# Patient Record
Sex: FEMALE | Race: WHITE | NOT HISPANIC OR LATINO | Employment: FULL TIME | URBAN - METROPOLITAN AREA
[De-identification: names, ages, dates, MRNs, and addresses within clinical notes are randomized per-mention and may not be internally consistent; named-entity substitution may affect disease eponyms.]

---

## 2019-05-29 ENCOUNTER — CONVERSION ENCOUNTER (OUTPATIENT)
Dept: OTHER | Facility: HOSPITAL | Age: 54
End: 2019-05-29

## 2019-05-29 ENCOUNTER — HOSPITAL ENCOUNTER (OUTPATIENT)
Dept: OTHER | Facility: HOSPITAL | Age: 54
Discharge: HOME | End: 2019-05-29
Attending: NURSE PRACTITIONER | Admitting: NURSE PRACTITIONER

## 2019-05-29 LAB
25(OH)D3 SERPL-MCNC: 21 NG/ML (ref 30–100)
ALBUMIN SERPL-MCNC: 4.2 G/DL (ref 3.5–4.8)
ALBUMIN/GLOB SERPL: 1.7 {RATIO} (ref 1–1.7)
ALP SERPL-CCNC: 32 IU/L (ref 32–91)
ALT SERPL-CCNC: 17 IU/L (ref 14–54)
ANION GAP SERPL CALC-SCNC: 16.7 MMOL/L (ref 10–20)
AST SERPL-CCNC: 16 IU/L (ref 15–41)
BASOPHILS # BLD AUTO: 0.1 10*3/UL (ref 0–0.2)
BASOPHILS NFR BLD AUTO: 1 % (ref 0–2)
BILIRUB SERPL-MCNC: 0.9 MG/DL (ref 0.3–1.2)
BUN SERPL-MCNC: 7 MG/DL (ref 8–20)
BUN/CREAT SERPL: 8.8 (ref 5.4–26.2)
CALCIUM SERPL-MCNC: 9 MG/DL (ref 8.9–10.3)
CHLORIDE SERPL-SCNC: 102 MMOL/L (ref 101–111)
CHOLEST SERPL-MCNC: 257 MG/DL
CHOLEST/HDLC SERPL: 4.5 {RATIO}
CONV CO2: 23 MMOL/L (ref 22–32)
CONV LDL CHOLESTEROL DIRECT: 193 MG/DL (ref 0–100)
CONV TOTAL PROTEIN: 6.7 G/DL (ref 6.1–7.9)
CREAT UR-MCNC: 0.8 MG/DL (ref 0.4–1)
DIFFERENTIAL METHOD BLD: (no result)
EOSINOPHIL # BLD AUTO: 0.1 10*3/UL (ref 0–0.3)
EOSINOPHIL # BLD AUTO: 1 % (ref 0–3)
ERYTHROCYTE [DISTWIDTH] IN BLOOD BY AUTOMATED COUNT: 13.4 % (ref 11.5–14.5)
GLOBULIN UR ELPH-MCNC: 2.5 G/DL (ref 2.5–3.8)
GLUCOSE SERPL-MCNC: 106 MG/DL (ref 65–99)
HBA1C MFR BLD: 5.4 % (ref 0–5.6)
HCT VFR BLD AUTO: 43 % (ref 35–49)
HDLC SERPL-MCNC: 58 MG/DL
HGB BLD-MCNC: 14.3 G/DL (ref 12–15)
LDLC/HDLC SERPL: 3.4 {RATIO}
LIPID INTERPRETATION: ABNORMAL
LYMPHOCYTES # BLD AUTO: 3.2 10*3/UL (ref 0.8–4.8)
LYMPHOCYTES NFR BLD AUTO: 27 % (ref 18–42)
MCH RBC QN AUTO: 28.7 PG (ref 26–32)
MCHC RBC AUTO-ENTMCNC: 33.2 G/DL (ref 32–36)
MCV RBC AUTO: 86.5 FL (ref 80–94)
MONOCYTES # BLD AUTO: 0.6 10*3/UL (ref 0.1–1.3)
MONOCYTES NFR BLD AUTO: 5 % (ref 2–11)
NEUTROPHILS # BLD AUTO: 7.7 10*3/UL (ref 2.3–8.6)
NEUTROPHILS NFR BLD AUTO: 66 % (ref 50–75)
NRBC BLD AUTO-RTO: 0 /100{WBCS}
NRBC/RBC NFR BLD MANUAL: 0 10*3/UL
PLATELET # BLD AUTO: 301 10*3/UL (ref 150–450)
PMV BLD AUTO: 8.7 FL (ref 7.4–10.4)
POTASSIUM SERPL-SCNC: 3.7 MMOL/L (ref 3.6–5.1)
RBC # BLD AUTO: 4.97 10*6/UL (ref 4–5.4)
SODIUM SERPL-SCNC: 138 MMOL/L (ref 136–144)
TRIGL SERPL-MCNC: 212 MG/DL
VIT B12 SERPL-MCNC: 305 PG/ML (ref 180–914)
VLDLC SERPL CALC-MCNC: 6 MG/DL
WBC # BLD AUTO: 11.8 10*3/UL (ref 4.5–11.5)

## 2019-06-04 VITALS
SYSTOLIC BLOOD PRESSURE: 107 MMHG | HEART RATE: 82 BPM | DIASTOLIC BLOOD PRESSURE: 73 MMHG | WEIGHT: 223 LBS | OXYGEN SATURATION: 98 % | HEIGHT: 67 IN | BODY MASS INDEX: 35 KG/M2

## 2019-06-06 NOTE — PROGRESS NOTES
History of Present Illness:  Patient is a 53-year-old female who presents today as a new patient to establish care. She is here for CPE. She does not have signficant medical hx.  She did have heart cath in 2015 and denies further chest pain. Patient was seen in ED on 5/21/19 with new   onset dizziness, syncope, and new onset studdering.  She reports having a headache intermittently. She denies confusion.  Speech is not slurred but reports studdering occurs when she becomes anxious. She is tearful in office.  She does report some anxiety   but denies hx of medication use.  She denies weakness.        Past Surgical History:     hysterectomy 1988     heart cath 2017    Family History Summary:   Mother - Has Family History of Other Cancer - Entered On: 5/29/2019  Father - Has Family History of Other Cancer - Entered On: 5/29/2019  Mother - Has Family History of Heart Disease - Entered On: 5/29/2019  Father - Has Family History of Heart Disease - Entered On: 5/29/2019        Risk Factors:     Smoked Tobacco Use:  Current every day smoker     Cigarettes:  Yes -- 1 pack(s) per day,Smokeless Tobacco Use:  Never  Drug use:  no  Alcohol use:  yes     Type:  occasional     Previous Tobacco Use:   Previous Alcohol Use:     Review of Systems     General       Denies fever, chills, sweats, anorexia, fatigue, weakness, malaise, weight loss, weight gain and sleep disorder.    Eyes       Denies vision loss - 1 eye, double vision, eye irritation, vision loss - both eyes, blurring, eye pain, halos, discharge, light sensitivity, Color Blindness, Decreased night vision, excessive tearing, eye redness and periorbital puffiness.    ENT       Denies ringing in the ears, ear discharge, earache, decreased hearing, nasal congestion, nosebleeds, difficulty swallowing, hoarseness, sore throat, Post-nasal drainage, sneezing, bleeding gums, oral ulcers, decreased sense of smell, dental issues,   runny nose, sinus pain and decreased sense of  taste.    CV       Denies difficulty breathing at night, near fainting, chest pain or discomfort, racing/skipping heart beats, fatigue, lightheadedness, shortness of breath with exertion, palpitations, swelling of hands or feet, difficulty breathing while lying down,   fainting, leg cramps with exertion, bluish discoloration of lips or nails, weight gain, leg cramps, leg pain, varicose veins, paroxysmal nocturnal dyspnea and bluish or purplish discoloration of hands/feet.    Resp       Denies sleep disturbances due to breathing, cough, shortness of breath, coughing up blood, chest discomfort, wheezing, excessive sputum, excessive snoring and TB exposure risk.    GI       Denies excessive appetite, loss of appetite, indigestion, vomiting blood, nausea, vomiting, yellowish skin color, gas, abdominal pain, abdominal bloating, hemorrhoids, diarrhea, change in bowel habits, constipation, dark tarry stools, bloody stools,   abdominal mass, abdominal swelling, food intolerance, early satiety, stool incontinence, laxative use, odynophagia, painful defecation, need for antacids and blood after wiping.           Denies foul urinary discharge, blood in urine, urinary frequency, inability to empty bladder, urinary urgency, kidney pain, trouble starting urinary stream, painful urination, night time urination, inability to control bladder, genital sores, lack of   sexual drive, excessive heavy periods, missed periods, unusual urinary color, other abnormal vaginal bleeding, pelvic pain, amenorrhea, change in bladder habits, irregular menses, dysmenorrhea, vaginal itching, vaginal burning, dyspareunia, excessive   menstrual bleeding, vaginal bleeding, vaginal discharge, vaginal dryness, Hx of STD, bleeding after menopause, usage of HRT, flank pain, pelvic pain, polyuria, urethral discharge, urgency, retention and kidney stones.    MS       Denies muscle cramps, joint pain, joint swelling, presence of joint fluid, back pain,  stiffness, muscle weakness, arthritis, gout, loss of strength, muscle aches, neck pain, decreased ROM and joint redness.    Derm       Denies suspicious lesions.    Neuro       Complains of headaches and dizziness.       Denies difficulty with concentration, poor balance, disturbances in coordination, numbness, inability to speak, falling down, tingling, brief paralysis, visual disturbances, seizures, weakness, sensation of room spinning, tremors, fainting, excessive   daytime sleeping, memory loss, attention deficit, hyperactivity, unusual sensation and restless legs.    Psych       Complains of anxiety.       Denies sense of great danger, thoughts of suicide, mental problems, depression, thoughts of violence, frightening visions or sounds, change in sleep pattern, delusions, disorientation, easily irritated, feels safe at home, frequent crying,   hallucinations, hypersomnia, impaired cognitive function, inability to concentrate, mood changes, nervousness and personality changes.      Vital Signs:    Patient Profile:    53 Years Old Female  Height:     67 inches  Weight:     223 pounds  BMI:        34.92     O2 Sat:     98 %  Pulse rate: 82 / minute  BP Sittin / 73  (left arm)    Cuff size:  large    Medications: Medications were reviewed with the patient during this visit.  Allergies: Allergies were reviewed with the patient during this visit.  No Known Allergy.        Vitals Entered By: Aspen Alfredo [employer] (May 29, 2019 8:27 AM)      Vital Signs:    Patient Profile:    53 Years Old Female  Height:     67 inches  Weight:     223 pounds  (Measured Weight:   lbs.  oz.)  BMI:        34.92  Pulse rate: 82 / minute  O2 Sat:     98 %    BP sittin / 73  (left arm)  Cuff size:  large   Vitals Entered By: Aspen [employer] (May 29, 2019 8:27 AM)    Medications:  Medications were reviewed with the patient during this visit.    Allergies:   No Known Allergies  Allergies were reviewed with the patient during  this visit.      Physical Exam    General:      well developed, well nourished, in no acute distress.    Head:      normocephalic and atraumatic.    Eyes:      PERRL/EOM intact, conjunctiva and sclera clear with out nystagmus.    Ears:      TM's intact and clear with normal canals with grossly normal hearing.    Nose:      no deformity, discharge, inflammation, or lesions.    Mouth:      no deformity or lesions with good dentition.    Neck:      no masses, thyromegaly, or abnormal cervical nodes.    Lungs:      clear bilaterally to auscultation.    Heart:      non-displaced PMI, chest non-tender; regular rate and rhythm, S1, S2 without murmurs, rubs, or gallops  Abdomen:       normal bowel sounds; no hepatosplenomegaly no ventral,umbilical hernias or masses noted.    Msk:      no deformity or scoliosis noted of thoracic or lumbar spine.    Pulses:      pulses normal in all 4 extremities.    Extremities:      no clubbing, cyanosis, edema, or deformity noted with normal full range of motion of joints of all four extremities   Neurologic:      no focal deficits, normal sensation, reflexes, coordination, muscle strength and tone.  Patient does have studdering in office.  Psych:      anxious.  tearful.    Diabetes Management Exam:      Foot Exam (with socks and/or shoes not present):        Pulses:           pulses normal in all 4 extremities.        Blood Pressure:  Today's BP: 107/73 mm Hg              Impression & Recommendations:    Problem # 1:  Preventive Health Care (XWM13-J02.8)  Hysterectomy  Mammogram- order today  DEXA- order today  Colonoscopy-last year 2017  Shingrix discussed      Problem # 2:  Slurred speech (ICD-784.59) (HQP88-C20.81)  Send for Stat MRI.  Refer to neurology. At this point, patient's only symptom appears to be studdering which she reports happens when she becomes anxious so I suspect psychogenic.  I will do MRI and refer to neurology and psych. I discussed in detail that   patient should  go back to ED if symptoms seem to worsen.   Orders:  MRI BRAIN W/WO (STANDARD) (CPT-19093)      Problem # 3:  Syncope and collapse (ICD-780.2) (MMI95-V71)  Send for STAT MRI and labs.  Orders:  MRI BRAIN W/WO (STANDARD) (CPT-12854)      Problem # 4:  Anxiety (ICD-300.00) (MGK72-O94.9)  Refer to psych.    Medications Added to Medication List This Visit:  1)  Vitamin D (ergocalciferol) 28747 Unit Oral Capsule (Ergocalciferol) .... 1 by mouth every week  2)  B-12 1000 Mcg Oral Capsule (Cyanocobalamin) .... Take one tablet by mouth once daily  3)  Medrol 4 Mg Oral Tablet Therapy Pack (Methylprednisolone) .... By mouth as directed on package  4)  Augmentin 875-125 Mg Oral Tablet (Amoxicillin-pot clavulanate) .... Take 1 by mouth twice daily with meals for 10 days  5)  Atorvastatin Calcium 10 Mg Oral Tablet (Atorvastatin calcium) .... Take one by mouth daily  6)  Albuterol Sulfate Hfa 108 (90 Base) Mcg/act Inhalation Aerosol Solution (Albuterol sulfate) .... One puff every 4 hours    Other Orders:  [facility] COMPREHENSIVE METABOLIC PANEL (CMP) (MPC)  [facility] LIPID PANEL (LIPID)  [facility] CBC W/DIFF; PATH REVIEW IF INDICATED (CBC)  [facility] HEMOGLOBIN A1c (A1DCA)  [facility] VITAMIN D TOTAL (VITD)  [facility] VITAMIN B12 (B12)  BILATERAL SCREENING MAMMOGRAM (CPT-19128)  DEXA SCAN (CPT-44393)  New Well Exam 40-64 yrs. (85081)      Patient Instructions:  1)  Follow up in 1 week.          Prescriptions:  VITAMIN D (ERGOCALCIFEROL) 79699 UNIT ORAL CAPSULE (ERGOCALCIFEROL) 1 by mouth every week  #4[Capsule] x 2      Entered and Authorized by:  Elaine Solano      Electronically signed by:   Elaine Solano on 05/29/2019      Method used:    Electronically to               [facility] (retail)              [address] [place] [address]  [id number]              Ph: [phone]              Fax: [phone]      Note to Pharmacy: Route: ORAL;       RxID:   [id number]  B-12 1000 MCG ORAL CAPSULE (CYANOCOBALAMIN) Take one  tablet by mouth once daily  #30[Capsule] x 0      Entered and Authorized by:  Elaine Solano      Electronically signed by:   Elaine Solano on 05/29/2019      Method used:    Electronically to               [facility] (retail)              [facility] [address] [place]  [id number]              Ph: [phone]              Fax: [phone]      Note to Pharmacy: Route: ORAL;       RxID:   [id number]  MEDROL 4 MG ORAL TABLET THERAPY PACK (METHYLPREDNISOLONE) by mouth as directed on package  #1[Tablet] x 0      Entered and Authorized by:  Elaine Solano      Electronically signed by:   Elaine Solano on 05/29/2019      Method used:    Electronically to               [facility] [address] (retail)              [address] [place] [address], [place]  [id number]              Ph: [phone]              Fax: [phone]      Note to Pharmacy: Route: ORAL;       RxID:   [id number]  AUGMENTIN 875-125 MG ORAL TABLET (AMOXICILLIN-POT CLAVULANATE) take 1 by mouth twice daily with meals for 10 days  #20[Tablet] x 0      Entered and Authorized by:  Elaine Solano      Electronically signed by:   Elaine Solano on 05/29/2019      Method used:    Electronically to               [facility] [address] (retail)              [facility] [address], [place]  [id number]              Ph: [phone]              Fax: [phone]      Note to Pharmacy: Route: ORAL;       RxID:   [id number]  ATORVASTATIN CALCIUM 10 MG ORAL TABLET (ATORVASTATIN CALCIUM) Take one by mouth daily  #30[Tablet] x 3      Entered and Authorized by:  Elaine Solano      Electronically signed by:   Elaine Solano on 05/29/2019      Method used:    Electronically to               [facility] [address] (retail)              [facility] [address], [place]  [id number]              Ph: [phone]              Fax: [phone]      Note to Pharmacy:  Route: ORAL;       RxID:   [id number]                Medication Administration    Orders Added:  1)  MRI BRAIN W/WO (STANDARD) [CPT-85537]  2)  [facility] COMPREHENSIVE METABOLIC PANEL (CMP) [MPC]  3)  [facility] LIPID PANEL [LIPID]  4)  [facility] CBC W/DIFF; PATH REVIEW IF INDICATED [CBC]  5)  [facility] HEMOGLOBIN A1c [A1DCA]  6)  H VITAMIN D TOTAL [VITD]  7)  H VITAMIN B12 [B12]  8)  BILATERAL SCREENING MAMMOGRAM [CPT-32340]  9)  DEXA SCAN [CPT-86073]  10)  New Well Exam 40-64 yrs. [15637]        Electronically signed by Elaine Solano on 05/29/2019 at 5:04 PM  ________________________________________________________________________       Disclaimer: Converted Note message may not contain all data elements that existed in the legacy source system. Please see [employer] [facility] for the original note details.

## 2021-02-02 ENCOUNTER — OFFICE VISIT (OUTPATIENT)
Dept: FAMILY MEDICINE CLINIC | Facility: CLINIC | Age: 56
End: 2021-02-02

## 2021-02-02 VITALS
OXYGEN SATURATION: 98 % | BODY MASS INDEX: 30.76 KG/M2 | WEIGHT: 196 LBS | HEIGHT: 67 IN | SYSTOLIC BLOOD PRESSURE: 136 MMHG | TEMPERATURE: 98 F | HEART RATE: 66 BPM | DIASTOLIC BLOOD PRESSURE: 85 MMHG

## 2021-02-02 DIAGNOSIS — Z12.31 ENCOUNTER FOR SCREENING MAMMOGRAM FOR BREAST CANCER: ICD-10-CM

## 2021-02-02 DIAGNOSIS — Z72.0 TOBACCO USE: Primary | ICD-10-CM

## 2021-02-02 DIAGNOSIS — M62.838 MUSCLE SPASM: ICD-10-CM

## 2021-02-02 DIAGNOSIS — J44.9 CHRONIC OBSTRUCTIVE PULMONARY DISEASE, UNSPECIFIED COPD TYPE (HCC): ICD-10-CM

## 2021-02-02 DIAGNOSIS — F34.1 DYSTHYMIA: ICD-10-CM

## 2021-02-02 DIAGNOSIS — Z00.00 PREVENTATIVE HEALTH CARE: ICD-10-CM

## 2021-02-02 DIAGNOSIS — K59.04 CHRONIC IDIOPATHIC CONSTIPATION: ICD-10-CM

## 2021-02-02 PROCEDURE — 36415 COLL VENOUS BLD VENIPUNCTURE: CPT | Performed by: NURSE PRACTITIONER

## 2021-02-02 PROCEDURE — 84443 ASSAY THYROID STIM HORMONE: CPT | Performed by: NURSE PRACTITIONER

## 2021-02-02 PROCEDURE — 80061 LIPID PANEL: CPT | Performed by: NURSE PRACTITIONER

## 2021-02-02 PROCEDURE — 80053 COMPREHEN METABOLIC PANEL: CPT | Performed by: NURSE PRACTITIONER

## 2021-02-02 PROCEDURE — 85025 COMPLETE CBC W/AUTO DIFF WBC: CPT | Performed by: NURSE PRACTITIONER

## 2021-02-02 PROCEDURE — 99204 OFFICE O/P NEW MOD 45 MIN: CPT | Performed by: NURSE PRACTITIONER

## 2021-02-02 PROCEDURE — 86803 HEPATITIS C AB TEST: CPT | Performed by: NURSE PRACTITIONER

## 2021-02-02 RX ORDER — ESCITALOPRAM OXALATE 10 MG/1
10 TABLET ORAL DAILY
Qty: 30 TABLET | Refills: 1 | Status: SHIPPED | OUTPATIENT
Start: 2021-02-02 | End: 2021-03-02

## 2021-02-02 RX ORDER — ALBUTEROL SULFATE 90 UG/1
2 AEROSOL, METERED RESPIRATORY (INHALATION) EVERY 4 HOURS PRN
Qty: 1 G | Refills: 2 | Status: SHIPPED | OUTPATIENT
Start: 2021-02-02 | End: 2022-01-20 | Stop reason: SDUPTHER

## 2021-02-02 RX ORDER — MAGNESIUM CARB/ALUMINUM HYDROX 105-160MG
TABLET,CHEWABLE ORAL ONCE
COMMUNITY
End: 2021-03-02

## 2021-02-02 RX ORDER — ALBUTEROL SULFATE 90 UG/1
AEROSOL, METERED RESPIRATORY (INHALATION)
COMMUNITY
Start: 2019-05-29 | End: 2021-02-02 | Stop reason: SDUPTHER

## 2021-02-02 NOTE — PROGRESS NOTES
Chief Complaint  Establish Care (bowel issues and dehydration. Issues with constipation. She stated she goes several weeks at times without going. Noticed blood in stool with last bowel movement and had some vaginal bleeding on Thursday, Friday and Saturday. )    Subjective          Amy Pyle presents to [facility] for   Constipation  This is a chronic problem. The problem has been gradually worsening since onset. Her stool frequency is 1 time per week or less. Associated symptoms include abdominal pain, flatus, melena and nausea. Pertinent negatives include no diarrhea, difficulty urinating, rectal pain or vomiting. She has tried enemas and stool softeners (Magnesium Citrate) for the symptoms. There is no history of irritable bowel syndrome.   COPD  She complains of wheezing. There is no cough. This is a chronic problem. Associated symptoms include dyspnea on exertion. Her symptoms are alleviated by beta-agonist (out of medication). She reports moderate improvement on treatment.   Depression  Visit Type: initial  Onset of symptoms: 1 to 6 months ago  Progression since onset: gradually worsening  Patient presents with the following symptoms: decreased concentration, depressed mood and feelings of worthlessness.  Patient is not experiencing: suicidal ideas and thoughts of death.  Frequency of symptoms: most days   Severity: moderate   Aggravated by: family issues  Sleep quality: fair  Treatment tried: nothing      Muscle Spasms: Patient c/o muscle cramps/spasms in bilateral lower extremities, worse at night. No known aggravating factors. Patient reports that she does not drink water because she does not like the taste and works 8-12 hour shifts, standing the entire time.   PHQ-9 Depression Screening  Little interest or pleasure in doing things? 2   Feeling down, depressed, or hopeless? 2   Trouble falling or staying asleep, or sleeping too much? 3   Feeling tired or having little  "energy? 3   Poor appetite or overeating? 3   Feeling bad about yourself - or that you are a failure or have let yourself or your family down? 2   Trouble concentrating on things, such as reading the newspaper or watching television? 2   Moving or speaking so slowly that other people could have noticed? Or the opposite - being so fidgety or restless that you have been moving around a lot more than usual? 0   Thoughts that you would be better off dead, or of hurting yourself in some way? 0   PHQ-9 Total Score 17   If you checked off any problems, how difficult have these problems made it for you to do your work, take care of things at home, or get along with other people? Somewhat difficult     Objective   Vital Signs:   /85 (BP Location: Left arm, Patient Position: Sitting, Cuff Size: Adult)   Pulse 66   Temp 98 °F (36.7 °C) (Skin)   Ht 170.2 cm (67\")   Wt 88.9 kg (196 lb)   SpO2 98%   BMI 30.70 kg/m²     Physical Exam   Result Review :                 Assessment and Plan    Problem List Items Addressed This Visit        Gastrointestinal Abdominal     Chronic idiopathic constipation    Current Assessment & Plan     1. KUB, complete today  2. Refer to GI         Relevant Orders    XR Abdomen KUB    Ambulatory Referral to Gastroenterology (Completed)    CBC & Differential    Comprehensive Metabolic Panel    TSH    CBC Auto Differential       Genitourinary and Reproductive     Encounter for screening mammogram for breast cancer    Current Assessment & Plan     1. Mammogram ordered         Relevant Orders    Mammo Screening Bilateral With CAD       Health Encounters    Preventative health care    Current Assessment & Plan     1. CBC CMP TSH Lipid panel Hep C screening         Relevant Orders    Hepatitis C Antibody    Lipid Panel       Mental Health    Dysthymia    Current Assessment & Plan     1. Start Lexapro 10mg daily         Relevant Medications    escitalopram (Lexapro) 10 MG tablet       " Musculoskeletal and Injuries    Muscle spasm    Current Assessment & Plan     1. Check Magnesium level (CBC CMP ordered as well)  2. Push water  3. Stretch before and after work  4. Massage and heat therapy         Relevant Orders    Magnesium       Pulmonary and Pneumonias    Chronic obstructive pulmonary disease (CMS/HCC)    Current Assessment & Plan     1. ProAir PRN dyspnea/wheezing  2. Encouraged smoking cessation             Relevant Medications    albuterol sulfate  (90 Base) MCG/ACT inhaler       Tobacco    Tobacco use - Primary        I spent 45 minutes caring for Amy on this date of service. This time includes time spent by me in the following activities:preparing for the visit, obtaining and/or reviewing a separately obtained history, performing a medically appropriate examination and/or evaluation , counseling and educating the patient/family/caregiver, ordering medications, tests, or procedures, referring and communicating with other health care professionals , documenting information in the medical record and care coordination  Follow Up   Return in about 4 weeks (around 3/2/2021).  Patient was given instructions and counseling regarding her condition or for health maintenance advice. Please see specific information pulled into the AVS if appropriate.

## 2021-02-02 NOTE — ASSESSMENT & PLAN NOTE
1. Check Magnesium level (CBC CMP ordered as well)  2. Push water  3. Stretch before and after work  4. Massage and heat therapy

## 2021-02-02 NOTE — PATIENT INSTRUCTIONS
Steps to Quit Smoking  Smoking tobacco is the leading cause of preventable death. It can affect almost every organ in the body. Smoking puts you and those around you at risk for developing many serious chronic diseases. Quitting smoking can be difficult, but it is one of the best things that you can do for your health. It is never too late to quit.  How do I get ready to quit?  When you decide to quit smoking, create a plan to help you succeed. Before you quit:  · Pick a date to quit. Set a date within the next 2 weeks to give you time to prepare.  · Write down the reasons why you are quitting. Keep this list in places where you will see it often.  · Tell your family, friends, and co-workers that you are quitting. Support from your loved ones can make quitting easier.  · Talk with your health care provider about your options for quitting smoking.  · Find out what treatment options are covered by your health insurance.  · Identify people, places, things, and activities that make you want to smoke (triggers). Avoid them.  What first steps can I take to quit smoking?  · Throw away all cigarettes at home, at work, and in your car.  · Throw away smoking accessories, such as ashtrays and lighters.  · Clean your car. Make sure to empty the ashtray.  · Clean your home, including curtains and carpets.  What strategies can I use to quit smoking?  Talk with your health care provider about combining strategies, such as taking medicines while you are also receiving in-person counseling. Using these two strategies together makes you more likely to succeed in quitting than if you used either strategy on its own.  · If you are pregnant or breastfeeding, talk with your health care provider about finding counseling or other support strategies to quit smoking. Do not take medicine to help you quit smoking unless your health care provider tells you to do so.  To quit smoking:  Quit right away  · Quit smoking completely, instead of  gradually reducing how much you smoke over a period of time. Research shows that stopping smoking right away is more successful than gradually quitting.  · Attend in-person counseling to help you build problem-solving skills. You are more likely to succeed in quitting if you attend counseling sessions regularly. Even short sessions of 10 minutes can be effective.  Take medicine  You may take medicines to help you quit smoking. Some medicines require a prescription and some you can purchase over-the-counter. Medicines may have nicotine in them to replace the nicotine in cigarettes. Medicines may:  · Help to stop cravings.  · Help to relieve withdrawal symptoms.  Your health care provider may recommend:  · Nicotine patches, gum, or lozenges.  · Nicotine inhalers or sprays.  · Non-nicotine medicine that is taken by mouth.  Find resources  Find resources and support systems that can help you to quit smoking and remain smoke-free after you quit. These resources are most helpful when you use them often. They include:  · Online chats with a counselor.  · Telephone quitlines.  · Printed self-help materials.  · Support groups or group counseling.  · Text messaging programs.  · Mobile phone apps or applications. Use apps that can help you stick to your quit plan by providing reminders, tips, and encouragement. There are many free apps for mobile devices as well as websites. Examples include Quit Guide from the CDC and smokefree.gov  What things can I do to make it easier to quit?    · Reach out to your family and friends for support and encouragement. Call telephone quitlines (5-976-QUIT-NOW), reach out to support groups, or work with a counselor for support.  · Ask people who smoke to avoid smoking around you.  · Avoid places that trigger you to smoke, such as bars, parties, or smoke-break areas at work.  · Spend time with people who do not smoke.  · Lessen the stress in your life. Stress can be a smoking trigger for some  people. To lessen stress, try:  ? Exercising regularly.  ? Doing deep-breathing exercises.  ? Doing yoga.  ? Meditating.  ? Performing a body scan. This involves closing your eyes, scanning your body from head to toe, and noticing which parts of your body are particularly tense. Try to relax the muscles in those areas.  How will I feel when I quit smoking?  Day 1 to 3 weeks  Within the first 24 hours of quitting smoking, you may start to feel withdrawal symptoms. These symptoms are usually most noticeable 2-3 days after quitting, but they usually do not last for more than 2-3 weeks. You may experience these symptoms:  · Mood swings.  · Restlessness, anxiety, or irritability.  · Trouble concentrating.  · Dizziness.  · Strong cravings for sugary foods and nicotine.  · Mild weight gain.  · Constipation.  · Nausea.  · Coughing or a sore throat.  · Changes in how the medicines that you take for unrelated issues work in your body.  · Depression.  · Trouble sleeping (insomnia).  Week 3 and afterward  After the first 2-3 weeks of quitting, you may start to notice more positive results, such as:  · Improved sense of smell and taste.  · Decreased coughing and sore throat.  · Slower heart rate.  · Lower blood pressure.  · Clearer skin.  · The ability to breathe more easily.  · Fewer sick days.  Quitting smoking can be very challenging. Do not get discouraged if you are not successful the first time. Some people need to make many attempts to quit before they achieve long-term success. Do your best to stick to your quit plan, and talk with your health care provider if you have any questions or concerns.  Summary  · Smoking tobacco is the leading cause of preventable death. Quitting smoking is one of the best things that you can do for your health.  · When you decide to quit smoking, create a plan to help you succeed.  · Quit smoking right away, not slowly over a period of time.  · When you start quitting, seek help from your  health care provider, family, or friends.  This information is not intended to replace advice given to you by your health care provider. Make sure you discuss any questions you have with your health care provider.  Document Revised: 09/11/2020 Document Reviewed: 03/07/2020  Elsevier Patient Education © 2020 [employer]

## 2021-02-03 ENCOUNTER — TELEPHONE (OUTPATIENT)
Dept: FAMILY MEDICINE CLINIC | Facility: CLINIC | Age: 56
End: 2021-02-03

## 2021-02-03 LAB
ALBUMIN SERPL-MCNC: 4.4 G/DL (ref 3.5–5.2)
ALBUMIN/GLOB SERPL: 1.8 G/DL
ALP SERPL-CCNC: 46 U/L (ref 39–117)
ALT SERPL W P-5'-P-CCNC: 12 U/L (ref 1–33)
ANION GAP SERPL CALCULATED.3IONS-SCNC: 11.3 MMOL/L (ref 5–15)
AST SERPL-CCNC: 12 U/L (ref 1–32)
BASOPHILS # BLD AUTO: 0.09 10*3/MM3 (ref 0–0.2)
BASOPHILS NFR BLD AUTO: 0.7 % (ref 0–1.5)
BILIRUB SERPL-MCNC: 0.3 MG/DL (ref 0–1.2)
BUN SERPL-MCNC: 11 MG/DL (ref 6–20)
BUN/CREAT SERPL: 22 (ref 7–25)
CALCIUM SPEC-SCNC: 9.9 MG/DL (ref 8.6–10.5)
CHLORIDE SERPL-SCNC: 102 MMOL/L (ref 98–107)
CHOLEST SERPL-MCNC: 199 MG/DL (ref 0–200)
CO2 SERPL-SCNC: 26.7 MMOL/L (ref 22–29)
CREAT SERPL-MCNC: 0.5 MG/DL (ref 0.57–1)
DEPRECATED RDW RBC AUTO: 37.2 FL (ref 37–54)
EOSINOPHIL # BLD AUTO: 0.06 10*3/MM3 (ref 0–0.4)
EOSINOPHIL NFR BLD AUTO: 0.5 % (ref 0.3–6.2)
ERYTHROCYTE [DISTWIDTH] IN BLOOD BY AUTOMATED COUNT: 12.5 % (ref 12.3–15.4)
GFR SERPL CREATININE-BSD FRML MDRD: 127 ML/MIN/1.73
GLOBULIN UR ELPH-MCNC: 2.5 GM/DL
GLUCOSE SERPL-MCNC: 97 MG/DL (ref 65–99)
HCT VFR BLD AUTO: 40 % (ref 34–46.6)
HCV AB SER DONR QL: NORMAL
HDLC SERPL-MCNC: 50 MG/DL (ref 40–60)
HGB BLD-MCNC: 12.9 G/DL (ref 12–15.9)
IMM GRANULOCYTES # BLD AUTO: 0.04 10*3/MM3 (ref 0–0.05)
IMM GRANULOCYTES NFR BLD AUTO: 0.3 % (ref 0–0.5)
LDLC SERPL CALC-MCNC: 133 MG/DL (ref 0–100)
LDLC/HDLC SERPL: 2.62 {RATIO}
LYMPHOCYTES # BLD AUTO: 3.59 10*3/MM3 (ref 0.7–3.1)
LYMPHOCYTES NFR BLD AUTO: 29.5 % (ref 19.6–45.3)
MCH RBC QN AUTO: 27.2 PG (ref 26.6–33)
MCHC RBC AUTO-ENTMCNC: 32.3 G/DL (ref 31.5–35.7)
MCV RBC AUTO: 84.2 FL (ref 79–97)
MONOCYTES # BLD AUTO: 0.76 10*3/MM3 (ref 0.1–0.9)
MONOCYTES NFR BLD AUTO: 6.3 % (ref 5–12)
NEUTROPHILS NFR BLD AUTO: 62.7 % (ref 42.7–76)
NEUTROPHILS NFR BLD AUTO: 7.61 10*3/MM3 (ref 1.7–7)
NRBC BLD AUTO-RTO: 0 /100 WBC (ref 0–0.2)
PLATELET # BLD AUTO: 485 10*3/MM3 (ref 140–450)
PMV BLD AUTO: 10.3 FL (ref 6–12)
POTASSIUM SERPL-SCNC: 4.8 MMOL/L (ref 3.5–5.2)
PROT SERPL-MCNC: 6.9 G/DL (ref 6–8.5)
RBC # BLD AUTO: 4.75 10*6/MM3 (ref 3.77–5.28)
SODIUM SERPL-SCNC: 140 MMOL/L (ref 136–145)
TRIGL SERPL-MCNC: 90 MG/DL (ref 0–150)
TSH SERPL DL<=0.05 MIU/L-ACNC: 0.61 UIU/ML (ref 0.27–4.2)
VLDLC SERPL-MCNC: 16 MG/DL (ref 5–40)
WBC # BLD AUTO: 12.15 10*3/MM3 (ref 3.4–10.8)

## 2021-02-03 NOTE — PROGRESS NOTES
Please let patient know that her LDL is slightly elevated. I want her to decrease intake of fried/fatty foods. The rest of her lipid panel is WNL so we can just repeat in 6 months. I am waiting on KUB results. Her WBC is slightly elevated, likely r/t current GI condition. Once I have KUB results, we can plan accordingly.

## 2021-02-03 NOTE — TELEPHONE ENCOUNTER
----- Message from TESSA Renteria sent at 2/3/2021  9:02 AM EST -----  Please let patient know that her LDL is slightly elevated. I want her to decrease intake of fried/fatty foods. The rest of her lipid panel is WNL so we can just repeat in 6 months. I am waiting on KUB results. Her WBC is slightly elevated, likely r/t current GI condition. Once I have KUB results, we can plan accordingly.

## 2021-02-05 NOTE — PROGRESS NOTES
KUB shows normal gas pattern. I want patient to proceed with appointment with GI and keep f/u appointment with me next month.

## 2021-03-02 ENCOUNTER — OFFICE VISIT (OUTPATIENT)
Dept: FAMILY MEDICINE CLINIC | Facility: CLINIC | Age: 56
End: 2021-03-02

## 2021-03-02 VITALS
TEMPERATURE: 97.8 F | OXYGEN SATURATION: 99 % | HEART RATE: 69 BPM | SYSTOLIC BLOOD PRESSURE: 124 MMHG | HEIGHT: 67 IN | DIASTOLIC BLOOD PRESSURE: 76 MMHG | BODY MASS INDEX: 30.13 KG/M2 | WEIGHT: 192 LBS

## 2021-03-02 DIAGNOSIS — Z72.0 TOBACCO USE: Primary | ICD-10-CM

## 2021-03-02 DIAGNOSIS — F34.1 DYSTHYMIA: ICD-10-CM

## 2021-03-02 DIAGNOSIS — K59.04 CHRONIC IDIOPATHIC CONSTIPATION: ICD-10-CM

## 2021-03-02 DIAGNOSIS — E78.2 MIXED HYPERLIPIDEMIA: ICD-10-CM

## 2021-03-02 DIAGNOSIS — D72.829 LEUKOCYTOSIS, UNSPECIFIED TYPE: ICD-10-CM

## 2021-03-02 DIAGNOSIS — M54.50 ACUTE RIGHT-SIDED LOW BACK PAIN WITHOUT SCIATICA: ICD-10-CM

## 2021-03-02 PROCEDURE — 99214 OFFICE O/P EST MOD 30 MIN: CPT | Performed by: NURSE PRACTITIONER

## 2021-03-02 PROCEDURE — 36415 COLL VENOUS BLD VENIPUNCTURE: CPT | Performed by: NURSE PRACTITIONER

## 2021-03-02 PROCEDURE — 85025 COMPLETE CBC W/AUTO DIFF WBC: CPT | Performed by: NURSE PRACTITIONER

## 2021-03-02 RX ORDER — BACLOFEN 10 MG/1
10 TABLET ORAL 3 TIMES DAILY
Qty: 30 TABLET | Refills: 0 | Status: SHIPPED | OUTPATIENT
Start: 2021-03-02 | End: 2021-04-20

## 2021-03-02 RX ORDER — FLUOXETINE 10 MG/1
10 CAPSULE ORAL DAILY
Qty: 30 CAPSULE | Refills: 1 | Status: SHIPPED | OUTPATIENT
Start: 2021-03-02 | End: 2021-04-20

## 2021-03-02 NOTE — PATIENT INSTRUCTIONS
1. Start Melatonin 3mg QHS for sleep  2. Start taking Colace daily, may use twice daily  3. Add 1 scoop Miralax daily PRN  4. The goal is to have bowel movement every 2 days, no more than 3 days  5. Start Baclofen 10mg, use PRN back pain (break in half first time)  6. You may also use Voltaren gel, 3-4 times daily as needed  7. Stop Lexapro  8. Start Prozac 10mg daily  9. Call GI and schedule appointment  10. Have mammogram completed    Steps to Quit Smoking  Smoking tobacco is the leading cause of preventable death. It can affect almost every organ in the body. Smoking puts you and those around you at risk for developing many serious chronic diseases. Quitting smoking can be difficult, but it is one of the best things that you can do for your health. It is never too late to quit.  How do I get ready to quit?  When you decide to quit smoking, create a plan to help you succeed. Before you quit:  · Pick a date to quit. Set a date within the next 2 weeks to give you time to prepare.  · Write down the reasons why you are quitting. Keep this list in places where you will see it often.  · Tell your family, friends, and co-workers that you are quitting. Support from your loved ones can make quitting easier.  · Talk with your health care provider about your options for quitting smoking.  · Find out what treatment options are covered by your health insurance.  · Identify people, places, things, and activities that make you want to smoke (triggers). Avoid them.  What first steps can I take to quit smoking?  · Throw away all cigarettes at home, at work, and in your car.  · Throw away smoking accessories, such as ashtrays and lighters.  · Clean your car. Make sure to empty the ashtray.  · Clean your home, including curtains and carpets.  What strategies can I use to quit smoking?  Talk with your health care provider about combining strategies, such as taking medicines while you are also receiving in-person counseling. Using  these two strategies together makes you more likely to succeed in quitting than if you used either strategy on its own.  · If you are pregnant or breastfeeding, talk with your health care provider about finding counseling or other support strategies to quit smoking. Do not take medicine to help you quit smoking unless your health care provider tells you to do so.  To quit smoking:  Quit right away  · Quit smoking completely, instead of gradually reducing how much you smoke over a period of time. Research shows that stopping smoking right away is more successful than gradually quitting.  · Attend in-person counseling to help you build problem-solving skills. You are more likely to succeed in quitting if you attend counseling sessions regularly. Even short sessions of 10 minutes can be effective.  Take medicine  You may take medicines to help you quit smoking. Some medicines require a prescription and some you can purchase over-the-counter. Medicines may have nicotine in them to replace the nicotine in cigarettes. Medicines may:  · Help to stop cravings.  · Help to relieve withdrawal symptoms.  Your health care provider may recommend:  · Nicotine patches, gum, or lozenges.  · Nicotine inhalers or sprays.  · Non-nicotine medicine that is taken by mouth.  Find resources  Find resources and support systems that can help you to quit smoking and remain smoke-free after you quit. These resources are most helpful when you use them often. They include:  · Online chats with a counselor.  · Telephone quitlines.  · Printed self-help materials.  · Support groups or group counseling.  · Text messaging programs.  · Mobile phone apps or applications. Use apps that can help you stick to your quit plan by providing reminders, tips, and encouragement. There are many free apps for mobile devices as well as websites. Examples include Quit Guide from the CDC and smokefree.gov  What things can I do to make it easier to quit?    · Reach out  to your family and friends for support and encouragement. Call telephone quitlines (8-492-QUITNOW), reach out to support groups, or work with a counselor for support.  · Ask people who smoke to avoid smoking around you.  · Avoid places that trigger you to smoke, such as bars, parties, or smoke-break areas at work.  · Spend time with people who do not smoke.  · Lessen the stress in your life. Stress can be a smoking trigger for some people. To lessen stress, try:  ? Exercising regularly.  ? Doing deep-breathing exercises.  ? Doing yoga.  ? Meditating.  ? Performing a body scan. This involves closing your eyes, scanning your body from head to toe, and noticing which parts of your body are particularly tense. Try to relax the muscles in those areas.  How will I feel when I quit smoking?  Day 1 to 3 weeks  Within the first 24 hours of quitting smoking, you may start to feel withdrawal symptoms. These symptoms are usually most noticeable 2-3 days after quitting, but they usually do not last for more than 2-3 weeks. You may experience these symptoms:  · Mood swings.  · Restlessness, anxiety, or irritability.  · Trouble concentrating.  · Dizziness.  · Strong cravings for sugary foods and nicotine.  · Mild weight gain.  · Constipation.  · Nausea.  · Coughing or a sore throat.  · Changes in how the medicines that you take for unrelated issues work in your body.  · Depression.  · Trouble sleeping (insomnia).  Week 3 and afterward  After the first 2-3 weeks of quitting, you may start to notice more positive results, such as:  · Improved sense of smell and taste.  · Decreased coughing and sore throat.  · Slower heart rate.  · Lower blood pressure.  · Clearer skin.  · The ability to breathe more easily.  · Fewer sick days.  Quitting smoking can be very challenging. Do not get discouraged if you are not successful the first time. Some people need to make many attempts to quit before they achieve long-term success. Do your best  to stick to your quit plan, and talk with your health care provider if you have any questions or concerns.  Summary  · Smoking tobacco is the leading cause of preventable death. Quitting smoking is one of the best things that you can do for your health.  · When you decide to quit smoking, create a plan to help you succeed.  · Quit smoking right away, not slowly over a period of time.  · When you start quitting, seek help from your health care provider, family, or friends.  This information is not intended to replace advice given to you by your health care provider. Make sure you discuss any questions you have with your health care provider.  Document Revised: 09/11/2020 Document Reviewed: 03/07/2020  Elsevier Patient Education © 2020 [employer].

## 2021-03-02 NOTE — PROGRESS NOTES
Chief Complaint  Follow-up (4 week follow up)    Subjective          Amy Pyle presents to [facility]  Depression  Visit Type: follow-up  Patient presents with the following symptoms: depressed mood (increased crying) and feelings of worthlessness.  Patient is not experiencing: thoughts of death and weight loss.  Frequency of symptoms: most days   Severity: moderate   Sleep quality: poor  Nighttime awakenings: several (waking every 2 hours)  Compliance with medications:  %        Constipation  This is a chronic problem. The problem has been gradually worsening since onset. Her stool frequency is 1 time per week or less. The stool is described as firm and pellet like. The patient is not on a high fiber diet. She exercises regularly. There has been adequate water intake. Associated symptoms include abdominal pain (improving) and bloating. Pertinent negatives include no diarrhea, fecal incontinence, fever, melena, nausea or weight loss. She has tried stool softeners (KUB showed normal gas pattern) for the symptoms. The treatment provided mild relief.     Leukocytosis: CBC showed WBC of 12.15 with Neutrophil count 7.61 and lymphocyte count 3.59. Patient denies any s/s infection.     Back Pain: Patient complaining of right-sided back pain for the last week.  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  Patient has been trying Biofreeze, heating pad, Tylenol and Motrin with minimal relief.  Patient denies any radiculopathy, loss of bowel or bladder control.    Review of Systems   Constitutional: Negative for fever and unexpected weight loss.   Gastrointestinal: Positive for abdominal pain (improving), bloating and constipation. Negative for diarrhea, melena and nausea.   Psychiatric/Behavioral: Positive for depressed mood (increased crying).       Objective   Vital Signs:   /76 (BP Location: Left arm, Patient Position: Sitting, Cuff Size: Adult)   Pulse  "69   Temp 97.8 °F (36.6 °C) (Skin)   Ht 170.2 cm (67\")   Wt 87.1 kg (192 lb)   SpO2 99%   BMI 30.07 kg/m²     Physical Exam  Constitutional:       Appearance: Normal appearance.   HENT:      Head: Normocephalic.   Neck:      Musculoskeletal: Neck supple.   Cardiovascular:      Rate and Rhythm: Normal rate and regular rhythm.   Pulmonary:      Effort: Pulmonary effort is normal.      Breath sounds: Normal breath sounds.   Abdominal:      General: Abdomen is flat. Bowel sounds are normal.      Palpations: Abdomen is soft.       Musculoskeletal: Normal range of motion.      Lumbar back: She exhibits tenderness and pain.      Right lower leg: No edema.      Left lower leg: No edema.   Skin:     General: Skin is warm and dry.   Neurological:      Mental Status: She is alert and oriented to person, place, and time.      Gait: Gait is intact.   Psychiatric:         Attention and Perception: Attention normal.         Mood and Affect: Mood normal.         Speech: Speech normal.         PHQ-9 Depression Screening  Little interest or pleasure in doing things? 1   Feeling down, depressed, or hopeless? 2   Trouble falling or staying asleep, or sleeping too much? 2   Feeling tired or having little energy? 1   Poor appetite or overeating? 0   Feeling bad about yourself - or that you are a failure or have let yourself or your family down? 1   Trouble concentrating on things, such as reading the newspaper or watching television? 0   Moving or speaking so slowly that other people could have noticed? Or the opposite - being so fidgety or restless that you have been moving around a lot more than usual? 0   Thoughts that you would be better off dead, or of hurting yourself in some way? 0   PHQ-9 Total Score 7   If you checked off any problems, how difficult have these problems made it for you to do your work, take care of things at home, or get along with other people? Somewhat difficult      Result Review :     CMP    CMP 2/2/21 "   Glucose 97   BUN 11   Creatinine 0.50 (A)   eGFR Non African Am 127   Sodium 140   Potassium 4.8   Chloride 102   Calcium 9.9   Albumin 4.40   Total Bilirubin 0.3   Alkaline Phosphatase 46   AST (SGOT) 12   ALT (SGPT) 12   (A) Abnormal value            CBC    CBC 2/2/21   WBC 12.15 (A)   RBC 4.75   Hemoglobin 12.9   Hematocrit 40.0   MCV 84.2   MCH 27.2   MCHC 32.3   RDW 12.5   Platelets 485 (A)   (A) Abnormal value            Lipid Panel    Lipid Panel 2/2/21   Total Cholesterol 199   Triglycerides 90   HDL Cholesterol 50   VLDL Cholesterol 16   LDL Cholesterol  133 (A)   LDL/HDL Ratio 2.62   (A) Abnormal value            TSH    TSH 2/2/21   TSH 0.614           Data reviewed: Radiologic studies KUB          Assessment and Plan    Diagnoses and all orders for this visit:    1. Tobacco use (Primary)    2. Mixed hyperlipidemia  Assessment & Plan:  Lipid abnormalities are improving.  Nutritional counseling was provided. and Lipid-lowering therapy was not prescribed due to not indicated.  Lipids will be reassessed in 6 months.  The 10-year ASCVD risk score (Aayushjacklyn MEEK Jr., et al., 2013) is: 5.6%    Values used to calculate the score:      Age: 55 years      Sex: Female      Is Non- : No      Diabetic: No      Tobacco smoker: Yes      Systolic Blood Pressure: 124 mmHg      Is BP treated: No      HDL Cholesterol: 50 mg/dL      Total Cholesterol: 199 mg/dL        3. Chronic idiopathic constipation  Assessment & Plan:  1. Patient to start Colace 100mg 1-2 times daily  2. Miralax 17gm daily PRN  3. Reviewed KUB with patient  4. Follow up with GI      4. Dysthymia  Assessment & Plan:  1.  Discontinue Lexapro  2.  Start Prozac 10 mg daily  3.  Call with worsening depressions or adverse effects  4.  May take melatonin 3 mg nightly      5. Leukocytosis, unspecified type  Assessment & Plan:  1. Repeat CBC    Orders:  -     CBC & Differential  -     CBC Auto Differential    6. Acute right-sided low back pain  without sciatica  Assessment & Plan:  1.  Start baclofen 10 mg 3 times daily.  2.  Rest  3.  Apply heat as needed  4.  Discussed proper lifting techniques  5.  Call with new or worsening symptoms      Other orders  -     baclofen (LIORESAL) 10 MG tablet; Take 1 tablet by mouth 3 (Three) Times a Day.  Dispense: 30 tablet; Refill: 0  -     FLUoxetine (PROzac) 10 MG capsule; Take 1 capsule by mouth Daily.  Dispense: 30 capsule; Refill: 1    I spent 30 minutes caring for Amy on this date of service. This time includes time spent by me in the following activities:preparing for the visit, reviewing tests, obtaining and/or reviewing a separately obtained history, performing a medically appropriate examination and/or evaluation , counseling and educating the patient/family/caregiver, ordering medications, tests, or procedures, documenting information in the medical record and care coordination  Follow Up   Return in about 4 weeks (around 3/30/2021).  Patient was given instructions and counseling regarding her condition or for health maintenance advice. Please see specific information pulled into the AVS if appropriate.       Answers for HPI/ROS submitted by the patient on 3/2/2021   What is the primary reason for your visit?: Other  Please describe your symptoms.: Bowel issues  Have you had these symptoms before?: No  How long have you been having these symptoms?: Greater than 2 weeks  Please list any medications you are currently taking for this condition.: Just what Dr prescribed and laxatives

## 2021-03-02 NOTE — ASSESSMENT & PLAN NOTE
1. Patient to start Colace 100mg 1-2 times daily  2. Miralax 17gm daily PRN  3. Reviewed KUB with patient  4. Follow up with GI

## 2021-03-02 NOTE — ASSESSMENT & PLAN NOTE
1.  Discontinue Lexapro  2.  Start Prozac 10 mg daily  3.  Call with worsening depressions or adverse effects  4.  May take melatonin 3 mg nightly

## 2021-03-02 NOTE — ASSESSMENT & PLAN NOTE
Lipid abnormalities are improving.  Nutritional counseling was provided. and Lipid-lowering therapy was not prescribed due to not indicated.  Lipids will be reassessed in 6 months.  The 10-year ASCVD risk score (Aayush MEEK Jr., et al., 2013) is: 5.6%    Values used to calculate the score:      Age: 55 years      Sex: Female      Is Non- : No      Diabetic: No      Tobacco smoker: Yes      Systolic Blood Pressure: 124 mmHg      Is BP treated: No      HDL Cholesterol: 50 mg/dL      Total Cholesterol: 199 mg/dL

## 2021-03-03 ENCOUNTER — TELEPHONE (OUTPATIENT)
Dept: FAMILY MEDICINE CLINIC | Facility: CLINIC | Age: 56
End: 2021-03-03

## 2021-03-03 LAB
BASOPHILS # BLD AUTO: 0.1 10*3/MM3 (ref 0–0.2)
BASOPHILS NFR BLD AUTO: 1.1 % (ref 0–1.5)
DEPRECATED RDW RBC AUTO: 41 FL (ref 37–54)
EOSINOPHIL # BLD AUTO: 0.14 10*3/MM3 (ref 0–0.4)
EOSINOPHIL NFR BLD AUTO: 1.5 % (ref 0.3–6.2)
ERYTHROCYTE [DISTWIDTH] IN BLOOD BY AUTOMATED COUNT: 12.9 % (ref 12.3–15.4)
HCT VFR BLD AUTO: 40.3 % (ref 34–46.6)
HGB BLD-MCNC: 13 G/DL (ref 12–15.9)
IMM GRANULOCYTES # BLD AUTO: 0.02 10*3/MM3 (ref 0–0.05)
IMM GRANULOCYTES NFR BLD AUTO: 0.2 % (ref 0–0.5)
LYMPHOCYTES # BLD AUTO: 3.29 10*3/MM3 (ref 0.7–3.1)
LYMPHOCYTES NFR BLD AUTO: 35.6 % (ref 19.6–45.3)
MCH RBC QN AUTO: 28.1 PG (ref 26.6–33)
MCHC RBC AUTO-ENTMCNC: 32.3 G/DL (ref 31.5–35.7)
MCV RBC AUTO: 87 FL (ref 79–97)
MONOCYTES # BLD AUTO: 0.68 10*3/MM3 (ref 0.1–0.9)
MONOCYTES NFR BLD AUTO: 7.4 % (ref 5–12)
NEUTROPHILS NFR BLD AUTO: 5.02 10*3/MM3 (ref 1.7–7)
NEUTROPHILS NFR BLD AUTO: 54.2 % (ref 42.7–76)
NRBC BLD AUTO-RTO: 0 /100 WBC (ref 0–0.2)
PLATELET # BLD AUTO: 361 10*3/MM3 (ref 140–450)
PMV BLD AUTO: 10.6 FL (ref 6–12)
RBC # BLD AUTO: 4.63 10*6/MM3 (ref 3.77–5.28)
WBC # BLD AUTO: 9.25 10*3/MM3 (ref 3.4–10.8)

## 2021-03-03 NOTE — TELEPHONE ENCOUNTER
Left message for patient to call regarding results.    HUB TO READ:    Please let patient know WBC count normalized. We will repeat as needed.

## 2021-03-03 NOTE — TELEPHONE ENCOUNTER
----- Message from TESSA Renteria sent at 3/3/2021  8:09 AM EST -----  Please let patient know WBC count normalized. We will repeat as needed.

## 2021-04-20 ENCOUNTER — OFFICE VISIT (OUTPATIENT)
Dept: FAMILY MEDICINE CLINIC | Facility: CLINIC | Age: 56
End: 2021-04-20

## 2021-04-20 VITALS
TEMPERATURE: 97.5 F | WEIGHT: 188 LBS | HEIGHT: 67 IN | HEART RATE: 74 BPM | BODY MASS INDEX: 29.51 KG/M2 | DIASTOLIC BLOOD PRESSURE: 72 MMHG | OXYGEN SATURATION: 98 % | SYSTOLIC BLOOD PRESSURE: 107 MMHG

## 2021-04-20 DIAGNOSIS — B37.2 CANDIDIASIS OF SKIN: ICD-10-CM

## 2021-04-20 DIAGNOSIS — F34.1 DYSTHYMIA: ICD-10-CM

## 2021-04-20 DIAGNOSIS — M54.41 ACUTE RIGHT-SIDED LOW BACK PAIN WITH RIGHT-SIDED SCIATICA: ICD-10-CM

## 2021-04-20 DIAGNOSIS — Z72.0 TOBACCO USE: Primary | ICD-10-CM

## 2021-04-20 DIAGNOSIS — K59.04 CHRONIC IDIOPATHIC CONSTIPATION: ICD-10-CM

## 2021-04-20 PROCEDURE — 99214 OFFICE O/P EST MOD 30 MIN: CPT | Performed by: NURSE PRACTITIONER

## 2021-04-20 RX ORDER — NYSTATIN 100000 U/G
CREAM TOPICAL AS NEEDED
Qty: 30 G | Refills: 1 | Status: SHIPPED | OUTPATIENT
Start: 2021-04-20

## 2021-04-20 RX ORDER — METHYLPREDNISOLONE 4 MG/1
TABLET ORAL
Qty: 21 TABLET | Refills: 0 | Status: SHIPPED | OUTPATIENT
Start: 2021-04-20 | End: 2022-04-07

## 2021-04-20 NOTE — ASSESSMENT & PLAN NOTE
1.  Continue Colace 1-2 times daily as needed  2.  Continue MiraLAX as needed  3.  Continue to drink adequate water and consume dietary fiber  4.  Patient reports all rectal bleeding has stopped and when she spoke to the GI office, they advised that they would not repeat the colonoscopy until the following year as that is when she is due.  Patient canceled appointment with GI.  I advised that if she notices any additional rectal bleeding, to call and reschedule appointment.

## 2021-04-20 NOTE — ASSESSMENT & PLAN NOTE
1.  Discontinue Prozac  2.  Patient is now poorly tolerated two SSRI medications -she does not want to start any additional medication for depression.  Patient denies need for therapy

## 2021-04-20 NOTE — PROGRESS NOTES
"Chief Complaint  Follow-up (4 week follow up. Also has concerns about belly button being infected)    Subjective          Amy Pyle presents to [facility]     Patient complaining of burning and itching to her bellybutton for the last couple of weeks.  She reports she has put Neosporin to the bellybutton with no relief.  No known aggravating factors.    Constipation  The problem has been gradually improving since onset. Her stool frequency is 1 time per day. The stool is described as formed. The patient is on a high fiber diet. She exercises regularly. There has been adequate water intake. Associated symptoms include back pain. Pertinent negatives include no abdominal pain, diarrhea, melena, nausea, rectal pain or vomiting. She has tried laxatives and stool softeners for the symptoms. The treatment provided significant relief.   Back Pain  This is a new problem. The problem occurs constantly. The problem is unchanged. The pain is present in the lumbar spine. The pain is moderate. The symptoms are aggravated by standing and twisting. Associated symptoms include leg pain. Pertinent negatives include no abdominal pain, bladder incontinence, bowel incontinence, paresthesias, perianal numbness or weakness. She has tried NSAIDs for the symptoms.   Depression  Visit Type: follow-up  Patient presents with the following symptoms: depressed mood and nervousness/anxiety.  Patient is not experiencing: thoughts of death.  Frequency of symptoms: most days   Severity: mild   Sleep quality: good  Compliance with medications: poorly tolerated Prozac, stopped taking.            Objective   Vital Signs:   /72 (BP Location: Left arm, Patient Position: Sitting, Cuff Size: Adult)   Pulse 74   Temp 97.5 °F (36.4 °C) (Skin)   Ht 170.2 cm (67\")   Wt 85.3 kg (188 lb)   SpO2 98%   BMI 29.44 kg/m²       Physical Exam  Constitutional:       Appearance: Normal appearance.   HENT:      Head: " Normocephalic.   Cardiovascular:      Rate and Rhythm: Normal rate and regular rhythm.   Pulmonary:      Effort: Pulmonary effort is normal.      Breath sounds: Normal breath sounds.   Abdominal:      General: Abdomen is flat. Bowel sounds are normal.      Palpations: Abdomen is soft.      Comments: Redness noted to umbilicus.    Musculoskeletal:         General: Normal range of motion.      Cervical back: Neck supple.      Right lower leg: No edema.      Left lower leg: No edema.   Skin:     General: Skin is warm and dry.   Neurological:      Mental Status: She is alert and oriented to person, place, and time.      Gait: Gait is intact.   Psychiatric:         Attention and Perception: Attention normal.         Mood and Affect: Mood normal.         Speech: Speech normal.        Result Review :                 Assessment and Plan    Diagnoses and all orders for this visit:    1. Tobacco use (Primary)    2. Chronic idiopathic constipation  Assessment & Plan:  1.  Continue Colace 1-2 times daily as needed  2.  Continue MiraLAX as needed  3.  Continue to drink adequate water and consume dietary fiber  4.  Patient reports all rectal bleeding has stopped and when she spoke to the GI office, they advised that they would not repeat the colonoscopy until the following year as that is when she is due.  Patient canceled appointment with GI.  I advised that if she notices any additional rectal bleeding, to call and reschedule appointment.      3. Acute right-sided low back pain with right-sided sciatica  Assessment & Plan:  1.  Patient reports minimal improvement with ibuprofen.  She denies any improvement with baclofen prescribed last visit  2.  Start Medrol Dosepak  3.  Order lumbar x-ray    Orders:  -     XR Spine Lumbar Complete 4+VW; Future    4. Dysthymia  Assessment & Plan:  1.  Discontinue Prozac  2.  Patient is now poorly tolerated two SSRI medications -she does not want to start any additional medication for  depression.  Patient denies need for therapy      5. Candidiasis of skin  Assessment & Plan:  1.  Nystatin cream, apply with Q-tip to the umbilicus daily  2.  Keep skin clean and dry      Other orders  -     methylPREDNISolone (MEDROL) 4 MG dose pack; Take as directed on package instructions.  Dispense: 21 tablet; Refill: 0  -     nystatin (MYCOSTATIN) 543732 UNIT/GM cream; Apply  topically to the appropriate area as directed As Needed (yeast).  Dispense: 30 g; Refill: 1    I spent 30 minutes caring for Amy on this date of service. This time includes time spent by me in the following activities:preparing for the visit, obtaining and/or reviewing a separately obtained history, performing a medically appropriate examination and/or evaluation , counseling and educating the patient/family/caregiver, ordering medications, tests, or procedures and documenting information in the medical record  Follow Up   Return in about 3 months (around 7/20/2021).  Patient was given instructions and counseling regarding her condition or for health maintenance advice. Please see specific information pulled into the AVS if appropriate.       Answers for HPI/ROS submitted by the patient on 4/19/2021  Please describe your symptoms.: Follow up lower back issues  Have you had these symptoms before?: Yes  How long have you been having these symptoms?: Greater than 2 weeks  Please list any medications you are currently taking for this condition.: Ibuprofen  Please describe any probable cause for these symptoms. : Strained it  What is the primary reason for your visit?: Other

## 2021-04-20 NOTE — PATIENT INSTRUCTIONS
Steps to Quit Smoking  Smoking tobacco is the leading cause of preventable death. It can affect almost every organ in the body. Smoking puts you and those around you at risk for developing many serious chronic diseases. Quitting smoking can be difficult, but it is one of the best things that you can do for your health. It is never too late to quit.  How do I get ready to quit?  When you decide to quit smoking, create a plan to help you succeed. Before you quit:  · Pick a date to quit. Set a date within the next 2 weeks to give you time to prepare.  · Write down the reasons why you are quitting. Keep this list in places where you will see it often.  · Tell your family, friends, and co-workers that you are quitting. Support from your loved ones can make quitting easier.  · Talk with your health care provider about your options for quitting smoking.  · Find out what treatment options are covered by your health insurance.  · Identify people, places, things, and activities that make you want to smoke (triggers). Avoid them.  What first steps can I take to quit smoking?  · Throw away all cigarettes at home, at work, and in your car.  · Throw away smoking accessories, such as ashtrays and lighters.  · Clean your car. Make sure to empty the ashtray.  · Clean your home, including curtains and carpets.  What strategies can I use to quit smoking?  Talk with your health care provider about combining strategies, such as taking medicines while you are also receiving in-person counseling. Using these two strategies together makes you more likely to succeed in quitting than if you used either strategy on its own.  · If you are pregnant or breastfeeding, talk with your health care provider about finding counseling or other support strategies to quit smoking. Do not take medicine to help you quit smoking unless your health care provider tells you to do so.  To quit smoking:  Quit right away  · Quit smoking completely, instead of  gradually reducing how much you smoke over a period of time. Research shows that stopping smoking right away is more successful than gradually quitting.  · Attend in-person counseling to help you build problem-solving skills. You are more likely to succeed in quitting if you attend counseling sessions regularly. Even short sessions of 10 minutes can be effective.  Take medicine  You may take medicines to help you quit smoking. Some medicines require a prescription and some you can purchase over-the-counter. Medicines may have nicotine in them to replace the nicotine in cigarettes. Medicines may:  · Help to stop cravings.  · Help to relieve withdrawal symptoms.  Your health care provider may recommend:  · Nicotine patches, gum, or lozenges.  · Nicotine inhalers or sprays.  · Non-nicotine medicine that is taken by mouth.  Find resources  Find resources and support systems that can help you to quit smoking and remain smoke-free after you quit. These resources are most helpful when you use them often. They include:  · Online chats with a counselor.  · Telephone quitlines.  · Printed self-help materials.  · Support groups or group counseling.  · Text messaging programs.  · Mobile phone apps or applications. Use apps that can help you stick to your quit plan by providing reminders, tips, and encouragement. There are many free apps for mobile devices as well as websites. Examples include Quit Guide from the CDC and smokefree.gov  What things can I do to make it easier to quit?    · Reach out to your family and friends for support and encouragement. Call telephone quitlines (1-626-QUIT-NOW), reach out to support groups, or work with a counselor for support.  · Ask people who smoke to avoid smoking around you.  · Avoid places that trigger you to smoke, such as bars, parties, or smoke-break areas at work.  · Spend time with people who do not smoke.  · Lessen the stress in your life. Stress can be a smoking trigger for some  people. To lessen stress, try:  ? Exercising regularly.  ? Doing deep-breathing exercises.  ? Doing yoga.  ? Meditating.  ? Performing a body scan. This involves closing your eyes, scanning your body from head to toe, and noticing which parts of your body are particularly tense. Try to relax the muscles in those areas.  How will I feel when I quit smoking?  Day 1 to 3 weeks  Within the first 24 hours of quitting smoking, you may start to feel withdrawal symptoms. These symptoms are usually most noticeable 2-3 days after quitting, but they usually do not last for more than 2-3 weeks. You may experience these symptoms:  · Mood swings.  · Restlessness, anxiety, or irritability.  · Trouble concentrating.  · Dizziness.  · Strong cravings for sugary foods and nicotine.  · Mild weight gain.  · Constipation.  · Nausea.  · Coughing or a sore throat.  · Changes in how the medicines that you take for unrelated issues work in your body.  · Depression.  · Trouble sleeping (insomnia).  Week 3 and afterward  After the first 2-3 weeks of quitting, you may start to notice more positive results, such as:  · Improved sense of smell and taste.  · Decreased coughing and sore throat.  · Slower heart rate.  · Lower blood pressure.  · Clearer skin.  · The ability to breathe more easily.  · Fewer sick days.  Quitting smoking can be very challenging. Do not get discouraged if you are not successful the first time. Some people need to make many attempts to quit before they achieve long-term success. Do your best to stick to your quit plan, and talk with your health care provider if you have any questions or concerns.  Summary  · Smoking tobacco is the leading cause of preventable death. Quitting smoking is one of the best things that you can do for your health.  · When you decide to quit smoking, create a plan to help you succeed.  · Quit smoking right away, not slowly over a period of time.  · When you start quitting, seek help from your  health care provider, family, or friends.  This information is not intended to replace advice given to you by your health care provider. Make sure you discuss any questions you have with your health care provider.  Document Revised: 09/11/2020 Document Reviewed: 03/07/2020  Elsevier Patient Education © 2021 [employer]

## 2021-04-20 NOTE — ASSESSMENT & PLAN NOTE
1.  Patient reports minimal improvement with ibuprofen.  She denies any improvement with baclofen prescribed last visit  2.  Start Medrol Dosepak  3.  Order lumbar x-ray

## 2021-07-09 ENCOUNTER — TELEPHONE (OUTPATIENT)
Dept: FAMILY MEDICINE CLINIC | Facility: CLINIC | Age: 56
End: 2021-07-09

## 2021-07-09 NOTE — TELEPHONE ENCOUNTER
Left message to remind pt to have lumbar xray completed. Asked that she call to let us know if she had this completed or if she needs assistance with scheduling.

## 2022-01-21 RX ORDER — ALBUTEROL SULFATE 90 UG/1
2 AEROSOL, METERED RESPIRATORY (INHALATION) EVERY 4 HOURS PRN
Qty: 1 G | Refills: 2 | Status: SHIPPED | OUTPATIENT
Start: 2022-01-21 | End: 2022-06-15 | Stop reason: SDUPTHER

## 2022-04-07 ENCOUNTER — OFFICE VISIT (OUTPATIENT)
Dept: FAMILY MEDICINE CLINIC | Facility: CLINIC | Age: 57
End: 2022-04-07

## 2022-04-07 VITALS
HEIGHT: 67 IN | OXYGEN SATURATION: 97 % | DIASTOLIC BLOOD PRESSURE: 85 MMHG | BODY MASS INDEX: 30.29 KG/M2 | SYSTOLIC BLOOD PRESSURE: 130 MMHG | HEART RATE: 78 BPM | WEIGHT: 193 LBS

## 2022-04-07 DIAGNOSIS — Z00.00 PREVENTATIVE HEALTH CARE: ICD-10-CM

## 2022-04-07 DIAGNOSIS — E55.9 VITAMIN D DEFICIENCY: ICD-10-CM

## 2022-04-07 DIAGNOSIS — R22.32 LUMP IN ARMPIT, LEFT: ICD-10-CM

## 2022-04-07 DIAGNOSIS — G62.9 NEUROPATHY: ICD-10-CM

## 2022-04-07 DIAGNOSIS — Z12.31 ENCOUNTER FOR SCREENING MAMMOGRAM FOR BREAST CANCER: ICD-10-CM

## 2022-04-07 DIAGNOSIS — F34.1 DYSTHYMIA: ICD-10-CM

## 2022-04-07 DIAGNOSIS — J44.9 CHRONIC OBSTRUCTIVE PULMONARY DISEASE, UNSPECIFIED COPD TYPE: Primary | ICD-10-CM

## 2022-04-07 LAB
BASOPHILS # BLD AUTO: 0.09 10*3/MM3 (ref 0–0.2)
BASOPHILS NFR BLD AUTO: 0.7 % (ref 0–1.5)
DEPRECATED RDW RBC AUTO: 39.2 FL (ref 37–54)
EOSINOPHIL # BLD AUTO: 0.11 10*3/MM3 (ref 0–0.4)
EOSINOPHIL NFR BLD AUTO: 0.9 % (ref 0.3–6.2)
ERYTHROCYTE [DISTWIDTH] IN BLOOD BY AUTOMATED COUNT: 12.6 % (ref 12.3–15.4)
HCT VFR BLD AUTO: 39.6 % (ref 34–46.6)
HGB BLD-MCNC: 12.7 G/DL (ref 12–15.9)
IMM GRANULOCYTES # BLD AUTO: 0.05 10*3/MM3 (ref 0–0.05)
IMM GRANULOCYTES NFR BLD AUTO: 0.4 % (ref 0–0.5)
LYMPHOCYTES # BLD AUTO: 4.22 10*3/MM3 (ref 0.7–3.1)
LYMPHOCYTES NFR BLD AUTO: 33.4 % (ref 19.6–45.3)
MCH RBC QN AUTO: 27.6 PG (ref 26.6–33)
MCHC RBC AUTO-ENTMCNC: 32.1 G/DL (ref 31.5–35.7)
MCV RBC AUTO: 86.1 FL (ref 79–97)
MONOCYTES # BLD AUTO: 0.9 10*3/MM3 (ref 0.1–0.9)
MONOCYTES NFR BLD AUTO: 7.1 % (ref 5–12)
NEUTROPHILS NFR BLD AUTO: 57.5 % (ref 42.7–76)
NEUTROPHILS NFR BLD AUTO: 7.25 10*3/MM3 (ref 1.7–7)
NRBC BLD AUTO-RTO: 0 /100 WBC (ref 0–0.2)
PLATELET # BLD AUTO: 335 10*3/MM3 (ref 140–450)
PMV BLD AUTO: 10.5 FL (ref 6–12)
RBC # BLD AUTO: 4.6 10*6/MM3 (ref 3.77–5.28)
WBC NRBC COR # BLD: 12.62 10*3/MM3 (ref 3.4–10.8)

## 2022-04-07 PROCEDURE — 82306 VITAMIN D 25 HYDROXY: CPT | Performed by: NURSE PRACTITIONER

## 2022-04-07 PROCEDURE — 85025 COMPLETE CBC W/AUTO DIFF WBC: CPT | Performed by: NURSE PRACTITIONER

## 2022-04-07 PROCEDURE — 36415 COLL VENOUS BLD VENIPUNCTURE: CPT | Performed by: NURSE PRACTITIONER

## 2022-04-07 PROCEDURE — 80053 COMPREHEN METABOLIC PANEL: CPT | Performed by: NURSE PRACTITIONER

## 2022-04-07 PROCEDURE — 82607 VITAMIN B-12: CPT | Performed by: NURSE PRACTITIONER

## 2022-04-07 PROCEDURE — 99214 OFFICE O/P EST MOD 30 MIN: CPT | Performed by: NURSE PRACTITIONER

## 2022-04-07 PROCEDURE — 86431 RHEUMATOID FACTOR QUANT: CPT | Performed by: NURSE PRACTITIONER

## 2022-04-07 PROCEDURE — 84443 ASSAY THYROID STIM HORMONE: CPT | Performed by: NURSE PRACTITIONER

## 2022-04-07 PROCEDURE — 80061 LIPID PANEL: CPT | Performed by: NURSE PRACTITIONER

## 2022-04-07 PROCEDURE — 86140 C-REACTIVE PROTEIN: CPT | Performed by: NURSE PRACTITIONER

## 2022-04-07 PROCEDURE — 85652 RBC SED RATE AUTOMATED: CPT | Performed by: NURSE PRACTITIONER

## 2022-04-07 RX ORDER — ESCITALOPRAM OXALATE 10 MG/1
10 TABLET ORAL DAILY
Qty: 30 TABLET | Refills: 1 | Status: SHIPPED | OUTPATIENT
Start: 2022-04-07 | End: 2022-06-15

## 2022-04-07 NOTE — PROGRESS NOTES
Chief Complaint  Mass (Under left armpit. ) and Tingling (Feet and hands onset: 4 days)    Subjective          Amy Pyle presents to [facility]  History of Present Illness    Patient presents with lump to left armpit, has noticed multiple lumps over the last two months. She reports she has decreased caffeine to see if that changed symptoms.     Patient also c/o burning to bilateral feet, reports they feel that they are trying to go to sleep. She reports her hands feel numb, feels like she is losing strength. Patient unable to keep a good . Patient denies dizziness, headache, blurred vision, chest pain, edema, cough or dyspnea. Patient does have COPD, uses Albuterol PRN. She reports 1-2 times daily as needed, symptoms worse during allergy season.     Patient having increased depression r/t loss of sister-in-law. She denies thoughts of death but reports she is struggling. Patient previously on Lexapro, stopped taking.     PHQ-9 Depression Screening  Little interest or pleasure in doing things? 2-->more than half the days   Feeling down, depressed, or hopeless? 3-->nearly every day   Trouble falling or staying asleep, or sleeping too much? 3-->nearly every day   Feeling tired or having little energy? 3-->nearly every day   Poor appetite or overeating? 3-->nearly every day   Feeling bad about yourself - or that you are a failure or have let yourself or your family down? 2-->more than half the days   Trouble concentrating on things, such as reading the newspaper or watching television? 2-->more than half the days   Moving or speaking so slowly that other people could have noticed? Or the opposite - being so fidgety or restless that you have been moving around a lot more than usual? 0-->not at all   Thoughts that you would be better off dead, or of hurting yourself in some way? 0-->not at all   PHQ-9 Total Score 18   If you checked off any problems, how difficult have these  "problems made it for you to do your work, take care of things at home, or get along with other people? very difficult       Objective   Vital Signs:   /85 (BP Location: Left arm, Patient Position: Sitting, Cuff Size: Adult)   Pulse 78   Ht 170.2 cm (67\")   Wt 87.5 kg (193 lb)   SpO2 97%   BMI 30.23 kg/m²            Physical Exam  Constitutional:       Appearance: Normal appearance.   HENT:      Head: Normocephalic.   Cardiovascular:      Rate and Rhythm: Normal rate and regular rhythm.   Pulmonary:      Effort: Pulmonary effort is normal.      Breath sounds: Normal breath sounds.   Chest:   Breasts:      Left: No swelling, inverted nipple, mass, nipple discharge, axillary adenopathy or supraclavicular adenopathy.       Abdominal:      General: Abdomen is flat. Bowel sounds are normal.      Palpations: Abdomen is soft.   Musculoskeletal:         General: Normal range of motion.      Cervical back: Neck supple.      Right lower leg: No edema.      Left lower leg: No edema.   Lymphadenopathy:      Upper Body:      Left upper body: No supraclavicular or axillary adenopathy.   Skin:     General: Skin is warm and dry.   Neurological:      Mental Status: She is alert and oriented to person, place, and time.      Cranial Nerves: No facial asymmetry.      Sensory: Sensation is intact.      Motor: Motor function is intact.      Coordination: Coordination is intact.      Gait: Gait is intact.   Psychiatric:         Attention and Perception: Attention normal.         Mood and Affect: Mood normal.         Speech: Speech normal.        Result Review :                 Assessment and Plan    Diagnoses and all orders for this visit:    1. Chronic obstructive pulmonary disease, unspecified COPD type (HCC) (Primary)  Assessment & Plan:  1.  Stable  2.  Albuterol as needed for dyspnea or wheezing          2. Preventative health care  -     CBC & Differential  -     Comprehensive Metabolic Panel  -     Lipid Panel  -     " TSH  -     Vitamin B12    3. Neuropathy  Assessment & Plan:  1.  Check labs  2.  Consider nerve conduction testing versus neurology referral    Orders:  -     Vitamin B12  -     C-reactive Protein    4. Vitamin D deficiency  -     Vitamin D 25 Hydroxy    5. Lump in armpit, left  Assessment & Plan:  1.  Ultrasound left axilla/breast  2.  Patient also due for screening mammogram    Orders:  -     Mammo Screening Digital Tomosynthesis Bilateral With CAD; Future  -     US Breast Left Limited; Future  -     US Axilla Left; Future    6. Encounter for screening mammogram for breast cancer  -     Mammo Screening Digital Tomosynthesis Bilateral With CAD; Future    7. Dysthymia  Assessment & Plan:  1.  Restart Lexapro 10 mg daily  2.  Follow-up in 4 weeks      Other orders  -     escitalopram (Lexapro) 10 MG tablet; Take 1 tablet by mouth Daily.  Dispense: 30 tablet; Refill: 1    I spent 30 minutes caring for Amy on this date of service. This time includes time spent by me in the following activities:preparing for the visit, reviewing tests, obtaining and/or reviewing a separately obtained history, performing a medically appropriate examination and/or evaluation , counseling and educating the patient/family/caregiver, ordering medications, tests, or procedures, documenting information in the medical record, independently interpreting results and communicating that information with the patient/family/caregiver and care coordination  Follow Up   Return in about 4 weeks (around 5/5/2022).  Patient was given instructions and counseling regarding her condition or for health maintenance advice. Please see specific information pulled into the AVS if appropriate.

## 2022-04-08 DIAGNOSIS — G62.9 NEUROPATHY: Primary | ICD-10-CM

## 2022-04-08 DIAGNOSIS — R22.32 LUMP IN ARMPIT, LEFT: Primary | ICD-10-CM

## 2022-04-08 LAB
25(OH)D3 SERPL-MCNC: 19.2 NG/ML (ref 30–100)
ALBUMIN SERPL-MCNC: 4.5 G/DL (ref 3.5–5.2)
ALBUMIN/GLOB SERPL: 1.7 G/DL
ALP SERPL-CCNC: 45 U/L (ref 39–117)
ALT SERPL W P-5'-P-CCNC: 14 U/L (ref 1–33)
ANION GAP SERPL CALCULATED.3IONS-SCNC: 12.6 MMOL/L (ref 5–15)
AST SERPL-CCNC: 13 U/L (ref 1–32)
BILIRUB SERPL-MCNC: 0.4 MG/DL (ref 0–1.2)
BUN SERPL-MCNC: 11 MG/DL (ref 6–20)
BUN/CREAT SERPL: 13.4 (ref 7–25)
CALCIUM SPEC-SCNC: 9.4 MG/DL (ref 8.6–10.5)
CHLORIDE SERPL-SCNC: 102 MMOL/L (ref 98–107)
CHOLEST SERPL-MCNC: 216 MG/DL (ref 0–200)
CHROMATIN AB SERPL-ACNC: <10 IU/ML (ref 0–14)
CO2 SERPL-SCNC: 26.4 MMOL/L (ref 22–29)
CREAT SERPL-MCNC: 0.82 MG/DL (ref 0.57–1)
CRP SERPL-MCNC: 0.86 MG/DL (ref 0–0.5)
EGFRCR SERPLBLD CKD-EPI 2021: 83 ML/MIN/1.73
ERYTHROCYTE [SEDIMENTATION RATE] IN BLOOD: 23 MM/HR (ref 0–30)
GLOBULIN UR ELPH-MCNC: 2.6 GM/DL
GLUCOSE SERPL-MCNC: 96 MG/DL (ref 65–99)
HDLC SERPL-MCNC: 67 MG/DL (ref 40–60)
LDLC SERPL CALC-MCNC: 136 MG/DL (ref 0–100)
LDLC/HDLC SERPL: 2 {RATIO}
POTASSIUM SERPL-SCNC: 4.4 MMOL/L (ref 3.5–5.2)
PROT SERPL-MCNC: 7.1 G/DL (ref 6–8.5)
SODIUM SERPL-SCNC: 141 MMOL/L (ref 136–145)
TRIGL SERPL-MCNC: 74 MG/DL (ref 0–150)
TSH SERPL DL<=0.05 MIU/L-ACNC: 1.02 UIU/ML (ref 0.27–4.2)
VIT B12 BLD-MCNC: 439 PG/ML (ref 211–946)
VLDLC SERPL-MCNC: 13 MG/DL (ref 5–40)

## 2022-04-08 NOTE — PROGRESS NOTES
Vitamin D level is quite low.  I am going to start patient on supplementation, sending to pharmacy.  Glucose, kidney and liver function all normal.  Total cholesterol slightly elevated but triglycerides and HDL look good.  No medication needed.  CRP is slightly elevated which indicates inflammation in the body.  White blood cell count is also somewhat elevated, indicating possibility of acute infection.  I would like her to come in today if she can to have a few more labs checked as well as a UA    The 10-year ASCVD risk score (Aayush MEEK Jr., et al., 2013) is: 2.5%    Values used to calculate the score:      Age: 56 years      Sex: Female      Is Non- : No      Diabetic: No      Tobacco smoker: No      Systolic Blood Pressure: 130 mmHg      Is BP treated: No      HDL Cholesterol: 67 mg/dL      Total Cholesterol: 216 mg/dL

## 2022-04-11 ENCOUNTER — TELEPHONE (OUTPATIENT)
Dept: FAMILY MEDICINE CLINIC | Facility: CLINIC | Age: 57
End: 2022-04-11

## 2022-04-11 ENCOUNTER — LAB (OUTPATIENT)
Dept: FAMILY MEDICINE CLINIC | Facility: CLINIC | Age: 57
End: 2022-04-11

## 2022-04-11 DIAGNOSIS — G62.9 NEUROPATHY: ICD-10-CM

## 2022-04-11 PROCEDURE — 86038 ANTINUCLEAR ANTIBODIES: CPT | Performed by: NURSE PRACTITIONER

## 2022-04-11 PROCEDURE — 86235 NUCLEAR ANTIGEN ANTIBODY: CPT | Performed by: NURSE PRACTITIONER

## 2022-04-11 PROCEDURE — 83516 IMMUNOASSAY NONANTIBODY: CPT | Performed by: NURSE PRACTITIONER

## 2022-04-11 PROCEDURE — 86225 DNA ANTIBODY NATIVE: CPT | Performed by: NURSE PRACTITIONER

## 2022-04-11 PROCEDURE — 81002 URINALYSIS NONAUTO W/O SCOPE: CPT | Performed by: NURSE PRACTITIONER

## 2022-04-11 PROCEDURE — 36415 COLL VENOUS BLD VENIPUNCTURE: CPT | Performed by: NURSE PRACTITIONER

## 2022-04-11 PROCEDURE — 81001 URINALYSIS AUTO W/SCOPE: CPT | Performed by: NURSE PRACTITIONER

## 2022-04-11 NOTE — PROGRESS NOTES
When she comes in for labs, please verify that she has been taking the vitamin D that I sent to the pharmacy or has she been taking supplemental in addition to that and how many units?

## 2022-04-11 NOTE — TELEPHONE ENCOUNTER
Patient called in approx 16:15 regarding vitamin d. I had to HUB notify her that Mariaelena was out of the office for the day already and it would likely be tomorrow. I did not see vitamin d in her med list. Please advise.

## 2022-04-12 LAB
BACTERIA UR QL AUTO: NORMAL /HPF
BILIRUB UR QL STRIP: NEGATIVE
CLARITY UR: CLEAR
COLOR UR: YELLOW
GLUCOSE UR STRIP-MCNC: NEGATIVE MG/DL
HGB UR QL STRIP.AUTO: NEGATIVE
HYALINE CASTS UR QL AUTO: NORMAL /LPF
KETONES UR QL STRIP: NEGATIVE
LEUKOCYTE ESTERASE UR QL STRIP.AUTO: ABNORMAL
NITRITE UR QL STRIP: NEGATIVE
PH UR STRIP.AUTO: 5.5 [PH] (ref 5–8)
PROT UR QL STRIP: NEGATIVE
RBC # UR STRIP: NORMAL /HPF
REF LAB TEST METHOD: NORMAL
SP GR UR STRIP: 1.02 (ref 1–1.03)
SQUAMOUS #/AREA URNS HPF: NORMAL /HPF
UROBILINOGEN UR QL STRIP: ABNORMAL
WBC # UR STRIP: NORMAL /HPF

## 2022-04-12 RX ORDER — MELATONIN
2000 DAILY
Qty: 60 TABLET | Refills: 2 | Status: SHIPPED | OUTPATIENT
Start: 2022-04-12

## 2022-04-12 NOTE — PROGRESS NOTES
UA is normal, no indication of infection
Render Note In Bullet Format When Appropriate: No
Post-Care Instructions: I reviewed with the patient in detail post-care instructions. Patient is to wear sunprotection, and avoid picking at any of the treated lesions. Pt may apply Vaseline to crusted or scabbing areas.
Duration Of Freeze Thaw-Cycle (Seconds): 15
Number Of Freeze-Thaw Cycles: 1 freeze-thaw cycle
Detail Level: Detailed
Consent: The patient's consent was obtained including but not limited to risks of crusting, scabbing, blistering, scarring, darker or lighter pigmentary change, recurrence, incomplete removal and infection.

## 2022-04-13 ENCOUNTER — TELEPHONE (OUTPATIENT)
Dept: FAMILY MEDICINE CLINIC | Facility: CLINIC | Age: 57
End: 2022-04-13

## 2022-04-13 DIAGNOSIS — G62.9 NEUROPATHY: Primary | ICD-10-CM

## 2022-04-13 DIAGNOSIS — R76.8 ANA POSITIVE: ICD-10-CM

## 2022-04-13 LAB — ANA SER QL: ABNORMAL

## 2022-04-13 NOTE — TELEPHONE ENCOUNTER
Pt returned call and I read her both notes r/t labs. She indicated understanding about rheumatology referral. She also wanted provider to know that the pharmacy would not fill the VIT D rx and told her it was available OTC, so she just bought the OTC one.

## 2022-04-13 NOTE — PROGRESS NOTES
Patient's VINCENT which is testing for autoimmune disease is equivocal which means that it is not negative but is not fully positive.  With patient's new onset neuropathy, I am sending her to rheumatology for further evaluation

## 2022-04-14 LAB
CENTROMERE B AB SER-ACNC: 1.5 AI (ref 0–0.9)
CHROMATIN AB SERPL-ACNC: <0.2 AI (ref 0–0.9)
DSDNA AB SER-ACNC: <1 IU/ML (ref 0–9)
ENA JO1 AB SER-ACNC: <0.2 AI (ref 0–0.9)
ENA RNP AB SER-ACNC: <0.2 AI (ref 0–0.9)
ENA SCL70 AB SER-ACNC: 0.4 AI (ref 0–0.9)
ENA SM AB SER-ACNC: <0.2 AI (ref 0–0.9)
ENA SM+RNP AB SER-ACNC: <0.2 AI (ref 0–0.9)
ENA SS-A AB SER-ACNC: <0.2 AI (ref 0–0.9)
ENA SS-B AB SER-ACNC: <0.2 AI (ref 0–0.9)
Lab: ABNORMAL
RIBOSOMAL P AB SER-ACNC: <0.2 AI (ref 0–0.9)

## 2022-04-15 ENCOUNTER — TELEPHONE (OUTPATIENT)
Dept: FAMILY MEDICINE CLINIC | Facility: CLINIC | Age: 57
End: 2022-04-15

## 2022-04-18 NOTE — PROGRESS NOTES
Please let patient know that her mammogram was normal.  If pain or lump persists to area of concern, she should let me know

## 2022-06-15 RX ORDER — ESCITALOPRAM OXALATE 10 MG/1
10 TABLET ORAL DAILY
Qty: 30 TABLET | Refills: 1 | Status: SHIPPED | OUTPATIENT
Start: 2022-06-15

## 2022-06-15 RX ORDER — ALBUTEROL SULFATE 90 UG/1
2 AEROSOL, METERED RESPIRATORY (INHALATION) EVERY 4 HOURS PRN
Qty: 1 G | Refills: 2 | Status: SHIPPED | OUTPATIENT
Start: 2022-06-15

## 2022-06-24 PROCEDURE — 86757 RICKETTSIA ANTIBODY: CPT | Performed by: NURSE PRACTITIONER

## 2022-06-24 PROCEDURE — 85025 COMPLETE CBC W/AUTO DIFF WBC: CPT | Performed by: NURSE PRACTITIONER

## 2022-06-24 PROCEDURE — 86668 FRANCISELLA TULARENSIS: CPT | Performed by: NURSE PRACTITIONER

## 2022-06-24 PROCEDURE — 86753 PROTOZOA ANTIBODY NOS: CPT | Performed by: NURSE PRACTITIONER

## 2022-06-24 PROCEDURE — 86666 EHRLICHIA ANTIBODY: CPT | Performed by: NURSE PRACTITIONER

## 2022-06-24 PROCEDURE — 86618 LYME DISEASE ANTIBODY: CPT | Performed by: NURSE PRACTITIONER

## 2022-07-11 PROCEDURE — U0004 COV-19 TEST NON-CDC HGH THRU: HCPCS | Performed by: NURSE PRACTITIONER

## 2022-07-28 ENCOUNTER — OFFICE VISIT (OUTPATIENT)
Dept: FAMILY MEDICINE CLINIC | Facility: CLINIC | Age: 57
End: 2022-07-28

## 2022-07-28 VITALS
SYSTOLIC BLOOD PRESSURE: 112 MMHG | HEIGHT: 67 IN | WEIGHT: 197 LBS | HEART RATE: 85 BPM | DIASTOLIC BLOOD PRESSURE: 80 MMHG | OXYGEN SATURATION: 99 % | BODY MASS INDEX: 30.92 KG/M2

## 2022-07-28 DIAGNOSIS — U07.1 COVID-19: Primary | ICD-10-CM

## 2022-07-28 PROCEDURE — 99213 OFFICE O/P EST LOW 20 MIN: CPT | Performed by: NURSE PRACTITIONER

## 2022-07-28 RX ORDER — GUAIFENESIN AND CODEINE PHOSPHATE 100; 10 MG/5ML; MG/5ML
5 SOLUTION ORAL 3 TIMES DAILY PRN
Qty: 150 ML | Refills: 0 | Status: SHIPPED | OUTPATIENT
Start: 2022-07-28 | End: 2022-08-15

## 2022-07-28 RX ORDER — GUAIFENESIN 600 MG/1
600 TABLET, EXTENDED RELEASE ORAL 2 TIMES DAILY
Qty: 60 TABLET | Refills: 0 | Status: SHIPPED | OUTPATIENT
Start: 2022-07-28 | End: 2022-08-15

## 2022-07-28 RX ORDER — PREDNISONE 10 MG/1
TABLET ORAL
Qty: 33 TABLET | Refills: 0 | Status: SHIPPED | OUTPATIENT
Start: 2022-07-28 | End: 2022-08-15

## 2022-07-28 RX ORDER — LEVOFLOXACIN 500 MG/1
500 TABLET, FILM COATED ORAL DAILY
Qty: 7 TABLET | Refills: 0 | Status: SHIPPED | OUTPATIENT
Start: 2022-07-28 | End: 2022-08-15

## 2022-07-28 NOTE — ASSESSMENT & PLAN NOTE
1.  Start Levaquin 500 mg daily x7 days  2.  Tapering prednisone  3.  Breztri 2 puffs BID x 7 days  4.  Ventolin as needed for dyspnea or wheezing  5.  Start Mucinex 600 mg twice daily  6.  Guaifenesin with codeine as needed for cough  7.  Off work until follow-up, patient to schedule for 10 days

## 2022-07-28 NOTE — PROGRESS NOTES
"Chief Complaint  Shortness of Breath (Still really struggling since COVID diagnosis/having some chest pain ) and Fatigue (Severe fatigue )    Subjective        Amy Pyle presents to [facility]  History of Present Illness    Patient tested + for COVID -19 on 7/11. Patient is c/o sweats, productive cough with dark green phlegm, chest tightness, wheezing and dyspnea. Patient does have history of COPD, using Albuterol PRN with minimal relief. She does report an intermittent fever.     Objective   Vital Signs:  /80 (BP Location: Left arm, Patient Position: Sitting, Cuff Size: Adult)   Pulse 85   Ht 170.2 cm (67\")   Wt 89.4 kg (197 lb)   SpO2 99%   BMI 30.85 kg/m²   Estimated body mass index is 30.85 kg/m² as calculated from the following:    Height as of this encounter: 170.2 cm (67\").    Weight as of this encounter: 89.4 kg (197 lb).          Physical Exam  Constitutional:       Appearance: Normal appearance.   HENT:      Head: Normocephalic.   Cardiovascular:      Rate and Rhythm: Normal rate and regular rhythm.   Pulmonary:      Effort: Pulmonary effort is normal.      Breath sounds: Examination of the right-lower field reveals decreased breath sounds and wheezing. Examination of the left-lower field reveals decreased breath sounds and wheezing. Decreased breath sounds and wheezing present.   Abdominal:      General: Abdomen is flat. Bowel sounds are normal.      Palpations: Abdomen is soft.   Musculoskeletal:         General: Normal range of motion.      Cervical back: Neck supple.      Right lower leg: No edema.      Left lower leg: No edema.   Skin:     General: Skin is warm and dry.   Neurological:      Mental Status: She is alert and oriented to person, place, and time.      Gait: Gait is intact.   Psychiatric:         Attention and Perception: Attention normal.         Mood and Affect: Mood normal.         Speech: Speech normal.        Result Review :              "   Assessment and Plan   Diagnoses and all orders for this visit:    1. COVID-19 (Primary)  Assessment & Plan:  1.  Start Levaquin 500 mg daily x7 days  2.  Tapering prednisone  3.  Breztri 2 puffs BID x 7 days  4.  Ventolin as needed for dyspnea or wheezing  5.  Start Mucinex 600 mg twice daily  6.  Guaifenesin with codeine as needed for cough  7.  Off work until follow-up, patient to schedule for 10 days      Orders:  -     guaiFENesin-codeine (GUAIFENESIN AC) 100-10 MG/5ML liquid; Take 5 mL by mouth 3 (Three) Times a Day As Needed for Cough or Congestion.  Dispense: 150 mL; Refill: 0    Other orders  -     guaiFENesin (Mucinex) 600 MG 12 hr tablet; Take 1 tablet by mouth 2 (Two) Times a Day.  Dispense: 60 tablet; Refill: 0  -     predniSONE (DELTASONE) 10 MG tablet; Take 5 po for 2 days, Take 4 for 2 days, then 3 for 2 days, then 2 for 2 days, then 1 for 5 days, then stop  Dispense: 33 tablet; Refill: 0  -     levoFLOXacin (Levaquin) 500 MG tablet; Take 1 tablet by mouth Daily.  Dispense: 7 tablet; Refill: 0         I spent 22 minutes caring for Amy on this date of service. This time includes time spent by me in the following activities:preparing for the visit, obtaining and/or reviewing a separately obtained history, performing a medically appropriate examination and/or evaluation , counseling and educating the patient/family/caregiver, ordering medications, tests, or procedures, documenting information in the medical record and care coordination  Follow Up   Return in about 10 days (around 8/7/2022).  Patient was given instructions and counseling regarding her condition or for health maintenance advice. Please see specific information pulled into the AVS if appropriate.

## 2022-07-29 ENCOUNTER — TELEPHONE (OUTPATIENT)
Dept: FAMILY MEDICINE CLINIC | Facility: CLINIC | Age: 57
End: 2022-07-29

## 2022-08-15 ENCOUNTER — OFFICE VISIT (OUTPATIENT)
Dept: FAMILY MEDICINE CLINIC | Facility: CLINIC | Age: 57
End: 2022-08-15

## 2022-08-15 VITALS
HEIGHT: 67 IN | SYSTOLIC BLOOD PRESSURE: 118 MMHG | DIASTOLIC BLOOD PRESSURE: 70 MMHG | WEIGHT: 197 LBS | BODY MASS INDEX: 30.92 KG/M2 | HEART RATE: 79 BPM | OXYGEN SATURATION: 99 %

## 2022-08-15 DIAGNOSIS — U09.9 COVID-19 LONG HAULER: Primary | ICD-10-CM

## 2022-08-15 PROCEDURE — 99213 OFFICE O/P EST LOW 20 MIN: CPT | Performed by: NURSE PRACTITIONER

## 2022-08-15 RX ORDER — BUDESONIDE AND FORMOTEROL FUMARATE DIHYDRATE 160; 4.5 UG/1; UG/1
2 AEROSOL RESPIRATORY (INHALATION)
Qty: 8 G | Refills: 2 | Status: SHIPPED | OUTPATIENT
Start: 2022-08-15 | End: 2022-08-17

## 2022-08-15 RX ORDER — BENZONATATE 100 MG/1
100 CAPSULE ORAL 3 TIMES DAILY PRN
Qty: 90 CAPSULE | Refills: 0 | Status: SHIPPED | OUTPATIENT
Start: 2022-08-15 | End: 2022-09-14

## 2022-08-15 NOTE — PROGRESS NOTES
"Chief Complaint  Follow-up (10 day follow up covid-19 long hauler's )    Subjective        Amy Pyle presents to [facility]  History of Present Illness    Patient presents for f/u visit regarding complications from COVID-19. She was seen on 7/28 with c/o sweats, productive cough, chest tightness, wheezing and dyspnea. She was started on Levaquin, Prednisone, Breztri and Ventolin. Patient reports she has been using all prescriptions as instructed. She reports approximately 60% improvement in symptoms.  Patient has been off work, requesting to return.  She continues to have intermittent cough with exertional dyspnea.    Objective   Vital Signs:  /70 (BP Location: Left arm, Patient Position: Sitting, Cuff Size: Adult)   Pulse 79   Ht 170.2 cm (67\")   Wt 89.4 kg (197 lb)   SpO2 99%   BMI 30.85 kg/m²   Estimated body mass index is 30.85 kg/m² as calculated from the following:    Height as of this encounter: 170.2 cm (67\").    Weight as of this encounter: 89.4 kg (197 lb).          Physical Exam  Constitutional:       Appearance: Normal appearance.   HENT:      Head: Normocephalic.   Cardiovascular:      Rate and Rhythm: Normal rate and regular rhythm.   Pulmonary:      Effort: Pulmonary effort is normal.      Breath sounds: Normal breath sounds.   Abdominal:      General: Abdomen is flat. Bowel sounds are normal.      Palpations: Abdomen is soft.   Musculoskeletal:         General: Normal range of motion.      Cervical back: Neck supple.      Right lower leg: No edema.      Left lower leg: No edema.   Skin:     General: Skin is warm and dry.   Neurological:      Mental Status: She is alert and oriented to person, place, and time.      Gait: Gait is intact.   Psychiatric:         Attention and Perception: Attention normal.         Mood and Affect: Mood normal.         Speech: Speech normal.        Result Review :                Assessment and Plan   Diagnoses and all orders " for this visit:    1. COVID-19 long hauler (Primary)  Assessment & Plan:  1.  Symbicort 2 puffs twice daily  2.  Ventolin as needed for dyspnea or wheezing  3.  Continue Mucinex 600 mg twice daily  4.  Patient is able to ambulate and maintain oxygen saturation greater than 90%  5.  She may return to work, recommended only 8-hour shifts instead of 12-hour shifts  6.  Advised patient wear face shield and not facemask while at work  7.  Follow-up in 4 weeks      Other orders  -     benzonatate (Tessalon Perles) 100 MG capsule; Take 1 capsule by mouth 3 (Three) Times a Day As Needed for Cough for up to 30 days.  Dispense: 90 capsule; Refill: 0  -     budesonide-formoterol (Symbicort) 160-4.5 MCG/ACT inhaler; Inhale 2 puffs 2 (Two) Times a Day for 30 days.  Dispense: 8 g; Refill: 2         I spent 25 minutes caring for Amy on this date of service. This time includes time spent by me in the following activities:preparing for the visit, obtaining and/or reviewing a separately obtained history, performing a medically appropriate examination and/or evaluation , counseling and educating the patient/family/caregiver, ordering medications, tests, or procedures, documenting information in the medical record and care coordination  Follow Up   Return in about 4 weeks (around 9/12/2022) for COVID-19 complications.  Patient was given instructions and counseling regarding her condition or for health maintenance advice. Please see specific information pulled into the AVS if appropriate.

## 2022-08-15 NOTE — ASSESSMENT & PLAN NOTE
1.  Symbicort 2 puffs twice daily  2.  Ventolin as needed for dyspnea or wheezing  3.  Continue Mucinex 600 mg twice daily  4.  Patient is able to ambulate and maintain oxygen saturation greater than 90%  5.  She may return to work, recommended only 8-hour shifts instead of 12-hour shifts  6.  Advised patient wear face shield and not facemask while at work  7.  Follow-up in 4 weeks

## 2022-08-17 RX ORDER — BUDESONIDE, GLYCOPYRROLATE, AND FORMOTEROL FUMARATE 160; 9; 4.8 UG/1; UG/1; UG/1
2 AEROSOL, METERED RESPIRATORY (INHALATION) 2 TIMES DAILY
Qty: 8 G | Refills: 2 | Status: SHIPPED | OUTPATIENT
Start: 2022-08-17 | End: 2022-11-16 | Stop reason: SDUPTHER

## 2022-09-13 ENCOUNTER — OFFICE VISIT (OUTPATIENT)
Dept: FAMILY MEDICINE CLINIC | Facility: CLINIC | Age: 57
End: 2022-09-13

## 2022-09-13 VITALS
BODY MASS INDEX: 30.92 KG/M2 | OXYGEN SATURATION: 98 % | HEART RATE: 83 BPM | DIASTOLIC BLOOD PRESSURE: 70 MMHG | HEIGHT: 67 IN | SYSTOLIC BLOOD PRESSURE: 118 MMHG | WEIGHT: 197 LBS

## 2022-09-13 DIAGNOSIS — U09.9 COVID-19 LONG HAULER: Primary | ICD-10-CM

## 2022-09-13 DIAGNOSIS — J44.9 CHRONIC OBSTRUCTIVE PULMONARY DISEASE, UNSPECIFIED COPD TYPE: ICD-10-CM

## 2022-09-13 PROCEDURE — 99214 OFFICE O/P EST MOD 30 MIN: CPT | Performed by: NURSE PRACTITIONER

## 2022-09-13 RX ORDER — MONTELUKAST SODIUM 10 MG/1
10 TABLET ORAL NIGHTLY
Qty: 30 TABLET | Refills: 1 | Status: SHIPPED | OUTPATIENT
Start: 2022-09-13 | End: 2022-09-13

## 2022-09-13 RX ORDER — IPRATROPIUM BROMIDE AND ALBUTEROL SULFATE 2.5; .5 MG/3ML; MG/3ML
3 SOLUTION RESPIRATORY (INHALATION) EVERY 4 HOURS PRN
Qty: 360 ML | Refills: 1 | Status: SHIPPED | OUTPATIENT
Start: 2022-09-13 | End: 2022-09-13

## 2022-09-13 RX ORDER — IPRATROPIUM BROMIDE AND ALBUTEROL SULFATE 2.5; .5 MG/3ML; MG/3ML
SOLUTION RESPIRATORY (INHALATION)
Qty: 1620 ML | Refills: 1 | Status: SHIPPED | OUTPATIENT
Start: 2022-09-13

## 2022-09-13 RX ORDER — MONTELUKAST SODIUM 10 MG/1
10 TABLET ORAL NIGHTLY
Qty: 90 TABLET | Refills: 1 | Status: SHIPPED | OUTPATIENT
Start: 2022-09-13 | End: 2022-11-16 | Stop reason: SDUPTHER

## 2022-09-13 RX ORDER — PREDNISONE 10 MG/1
TABLET ORAL
Qty: 33 TABLET | Refills: 0 | Status: SHIPPED | OUTPATIENT
Start: 2022-09-13 | End: 2022-10-24

## 2022-09-13 NOTE — ASSESSMENT & PLAN NOTE
1.  6-minute walk test completed and patient is able to maintain oxygen saturation of 98% at room air.  She wants to return to work and today patient is medically stable.  I will allow her to return to work in 8-hour shifts only.  Follow-up in 4 weeks

## 2022-09-13 NOTE — ASSESSMENT & PLAN NOTE
1.  Continue Breztri 2 puffs twice daily  2.  Nebulizer ordered  3.  Start DuoNeb treatments twice daily, may take third treatment if needed  4.  Add Singulair 10 mg nightly  5.  Tapering prednisone  6.  Pulmonary function testing ordered  7.  Refer to pulmonology

## 2022-09-13 NOTE — PROGRESS NOTES
"Chief Complaint  Follow-up (Follow up COVID 19 long hauler )    Subjective        Amy Pyle presents to [facility]  History of Present Illness    Patient presents for f/u visit regarding complications from COVID-19.  She tested positive for COVID-19 on 7/11 and has had persistent cough, chest tightness, wheezing and exertional dyspnea since diagnosis.  Patient is taking Breztri 2 puffs BID and Albuterol PRN. Patient reports using at least four times daily. She continues to have shortness of air with speaking and minimal exertion.  Patient attempted to return to work 1 month ago and  became dyspneic with sweats 1 hour into her shift.  Patient reports improvement in symptoms over the last month.  She does continue to have shortness of breath with exertion and wheezing.    Objective   Vital Signs:  /70 (BP Location: Left arm, Patient Position: Sitting, Cuff Size: Adult)   Pulse 83   Ht 170.2 cm (67\")   Wt 89.4 kg (197 lb)   SpO2 98%   BMI 30.85 kg/m²   Estimated body mass index is 30.85 kg/m² as calculated from the following:    Height as of this encounter: 170.2 cm (67\").    Weight as of this encounter: 89.4 kg (197 lb).          Physical Exam  Constitutional:       Appearance: Normal appearance.   HENT:      Head: Normocephalic.   Cardiovascular:      Rate and Rhythm: Normal rate and regular rhythm.   Pulmonary:      Effort: Pulmonary effort is normal.      Breath sounds: Examination of the right-lower field reveals wheezing. Examination of the left-lower field reveals wheezing. Wheezing present.   Abdominal:      General: Abdomen is flat. Bowel sounds are normal.      Palpations: Abdomen is soft.   Musculoskeletal:         General: Normal range of motion.      Cervical back: Neck supple.      Right lower leg: No edema.      Left lower leg: No edema.   Skin:     General: Skin is warm and dry.   Neurological:      Mental Status: She is alert and oriented to person, " place, and time.      Gait: Gait is intact.   Psychiatric:         Attention and Perception: Attention normal.         Mood and Affect: Mood normal.         Speech: Speech normal.        Result Review :    CMP    CMP 4/7/22   Glucose 96   BUN 11   Creatinine 0.82   Sodium 141   Potassium 4.4   Chloride 102   Calcium 9.4   Albumin 4.50   Total Bilirubin 0.4   Alkaline Phosphatase 45   AST (SGOT) 13   ALT (SGPT) 14           CBC    CBC 4/7/22 6/24/22   WBC 12.62 (A) 9.20   RBC 4.60 4.94   Hemoglobin 12.7 13.9   Hematocrit 39.6 42.0   MCV 86.1 85.1   MCH 27.6 28.1   MCHC 32.1 33.1   RDW 12.6 13.4   Platelets 335 359   (A) Abnormal value            Lipid Panel    Lipid Panel 4/7/22   Total Cholesterol 216 (A)   Triglycerides 74   HDL Cholesterol 67 (A)   VLDL Cholesterol 13   LDL Cholesterol  136 (A)   LDL/HDL Ratio 2.00   (A) Abnormal value            TSH    TSH 4/7/22   TSH 1.020                     Assessment and Plan {CC Problem List  Visit Diagnosis   ROS  Review (Popup)  Deanslist Maintenance  Quality  BestPractice  Medications  SmartSets  SnapShot Encounters  Media :23}  Diagnoses and all orders for this visit:    1. COVID-19 long hauler (Primary)  Assessment & Plan:  1.  6-minute walk test completed and patient is able to maintain oxygen saturation of 98% at room air.  She wants to return to work and today patient is medically stable.  I will allow her to return to work in 8-hour shifts only.  Follow-up in 4 weeks    Orders:  -     Full Pulmonary Function Test With Bronchodilator; Future  -     Ambulatory Referral to Pulmonology  -     Home Nebulizer    2. Chronic obstructive pulmonary disease, unspecified COPD type (HCC)  Assessment & Plan:  1.  Continue Breztri 2 puffs twice daily  2.  Nebulizer ordered  3.  Start DuoNeb treatments twice daily, may take third treatment if needed  4.  Add Singulair 10 mg nightly  5.  Tapering prednisone  6.  Pulmonary function testing ordered  7.  Refer to  pulmonology      Orders:  -     Full Pulmonary Function Test With Bronchodilator; Future  -     Ambulatory Referral to Pulmonology  -     Home Nebulizer    Other orders  -     montelukast (Singulair) 10 MG tablet; Take 1 tablet by mouth Every Night.  Dispense: 30 tablet; Refill: 1  -     ipratropium-albuterol (DUO-NEB) 0.5-2.5 mg/3 ml nebulizer; Take 3 mL by nebulization Every 4 (Four) Hours As Needed for Wheezing or Shortness of Air.  Dispense: 360 mL; Refill: 1  -     predniSONE (DELTASONE) 10 MG tablet; Take 5 po for 2 days, Take 4 for 2 days, then 3 for 2 days, then 2 for 2 days, then 1 for 5 days, then stop  Dispense: 33 tablet; Refill: 0         I spent 30 minutes caring for Amy on this date of service. This time includes time spent by me in the following activities:preparing for the visit, reviewing tests, obtaining and/or reviewing a separately obtained history, performing a medically appropriate examination and/or evaluation , counseling and educating the patient/family/caregiver, ordering medications, tests, or procedures, referring and communicating with other health care professionals , documenting information in the medical record, independently interpreting results and communicating that information with the patient/family/caregiver and care coordination  Follow Up   Return in about 4 weeks (around 10/11/2022) for COVID-19.  Patient was given instructions and counseling regarding her condition or for health maintenance advice. Please see specific information pulled into the AVS if appropriate.

## 2022-10-24 ENCOUNTER — OFFICE VISIT (OUTPATIENT)
Dept: FAMILY MEDICINE CLINIC | Facility: CLINIC | Age: 57
End: 2022-10-24

## 2022-10-24 VITALS
OXYGEN SATURATION: 98 % | HEIGHT: 67 IN | WEIGHT: 199 LBS | DIASTOLIC BLOOD PRESSURE: 80 MMHG | HEART RATE: 78 BPM | SYSTOLIC BLOOD PRESSURE: 138 MMHG | BODY MASS INDEX: 31.23 KG/M2

## 2022-10-24 DIAGNOSIS — J44.9 CHRONIC OBSTRUCTIVE PULMONARY DISEASE, UNSPECIFIED COPD TYPE: ICD-10-CM

## 2022-10-24 DIAGNOSIS — R21 RASH AND NONSPECIFIC SKIN ERUPTION: ICD-10-CM

## 2022-10-24 DIAGNOSIS — R44.1 HALLUCINATION, VISUAL: ICD-10-CM

## 2022-10-24 DIAGNOSIS — U09.9 COVID-19 LONG HAULER: Primary | ICD-10-CM

## 2022-10-24 DIAGNOSIS — F33.1 MAJOR DEPRESSIVE DISORDER, RECURRENT, MODERATE: ICD-10-CM

## 2022-10-24 PROCEDURE — 36415 COLL VENOUS BLD VENIPUNCTURE: CPT | Performed by: NURSE PRACTITIONER

## 2022-10-24 PROCEDURE — 85025 COMPLETE CBC W/AUTO DIFF WBC: CPT | Performed by: NURSE PRACTITIONER

## 2022-10-24 PROCEDURE — 99214 OFFICE O/P EST MOD 30 MIN: CPT | Performed by: NURSE PRACTITIONER

## 2022-10-24 PROCEDURE — 80048 BASIC METABOLIC PNL TOTAL CA: CPT | Performed by: NURSE PRACTITIONER

## 2022-10-24 PROCEDURE — 81001 URINALYSIS AUTO W/SCOPE: CPT | Performed by: NURSE PRACTITIONER

## 2022-10-24 RX ORDER — CLOTRIMAZOLE AND BETAMETHASONE DIPROPIONATE 10; .64 MG/G; MG/G
1 CREAM TOPICAL 2 TIMES DAILY
Qty: 60 G | Refills: 1 | Status: SHIPPED | OUTPATIENT
Start: 2022-10-24

## 2022-10-24 NOTE — ASSESSMENT & PLAN NOTE
1.  Check UA  2.  Check labs  3.  Discontinue Singulair  4.  Take Lexapro daily as prescribed  5.  Follow-up in 4 weeks

## 2022-10-24 NOTE — PROGRESS NOTES
Venipuncture Blood Specimen Collection  Venipuncture performed in the right arm by Anna Marie Keith MA with good hemostasis. Patient tolerated the procedure well without complications.   10/24/22   Anna Marie Keith MA

## 2022-10-24 NOTE — PATIENT INSTRUCTIONS
Stop Singulair for now  Breztri 2 puffs twice daily  Albuterol only as needed  Duoneb treatments twice daily  Take Lexapro (escitalopram) every day, do not miss doses

## 2022-10-24 NOTE — ASSESSMENT & PLAN NOTE
1.  Continue Breztri 2 puffs twice daily  2.  Use albuterol and DuoNeb treatments as needed for dyspnea or wheezing  3.  Patient cannot afford pulmonary function test  4.  She is to keep follow-up appointment with pulmonology

## 2022-10-24 NOTE — PROGRESS NOTES
"Chief Complaint  Follow-up (4 week follow up COVID long hauler )    Subjective        Amy Pyle presents to [facility]  History of Present Illness    Patient presents for follow-up visit regarding complications after having COVID-19 in July.  Patient is using Breztri 2 puffs twice daily, Singulair 10 mg nightly and DuoNeb treatments added at last visit.  She was also referred to pulmonology and pulmonary function testing was ordered. PFT scheduled for last week and was going to cost $500 out of pocket so patient canceled. Patient will see Dr. Persaud on 11/1.  Patient reports improvement in overall symptoms but continues to have exertional dyspnea. She fatigues easily. Patient also describing seeing things that are not there. She reports seeing things move across the floor and feels forgetful. Patient reports symptoms noticed approximately 2-3 weeks ago. She describes feeling depressed as well without anxiety. Patient is prescribed Lexapro 10mg but reports not taking regularly.      Patient is concerned about painful belly button and feels it is \"growing together.\"     Objective   Vital Signs:  /80 (BP Location: Left arm, Patient Position: Sitting, Cuff Size: Adult)   Pulse 78   Ht 170.2 cm (67\")   Wt 90.3 kg (199 lb)   SpO2 98%   BMI 31.17 kg/m²   Estimated body mass index is 31.17 kg/m² as calculated from the following:    Height as of this encounter: 170.2 cm (67\").    Weight as of this encounter: 90.3 kg (199 lb).          Physical Exam  Constitutional:       Appearance: Normal appearance.   HENT:      Head: Normocephalic.   Cardiovascular:      Rate and Rhythm: Normal rate and regular rhythm.   Pulmonary:      Effort: Pulmonary effort is normal.      Breath sounds: Normal breath sounds.   Abdominal:      General: Abdomen is flat. Bowel sounds are normal.      Palpations: Abdomen is soft.   Musculoskeletal:         General: Normal range of motion.      Cervical back: " Neck supple.      Right lower leg: No edema.      Left lower leg: No edema.   Skin:     General: Skin is warm and dry.   Neurological:      Mental Status: She is alert and oriented to person, place, and time.      Gait: Gait is intact.   Psychiatric:         Attention and Perception: Attention normal.         Mood and Affect: Mood normal. Affect is tearful.         Speech: Speech normal.        Result Review :    CMP    CMP 4/7/22   Glucose 96   BUN 11   Creatinine 0.82   Sodium 141   Potassium 4.4   Chloride 102   Calcium 9.4   Albumin 4.50   Total Bilirubin 0.4   Alkaline Phosphatase 45   AST (SGOT) 13   ALT (SGPT) 14           CBC    CBC 4/7/22 6/24/22   WBC 12.62 (A) 9.20   RBC 4.60 4.94   Hemoglobin 12.7 13.9   Hematocrit 39.6 42.0   MCV 86.1 85.1   MCH 27.6 28.1   MCHC 32.1 33.1   RDW 12.6 13.4   Platelets 335 359   (A) Abnormal value            Lipid Panel    Lipid Panel 4/7/22   Total Cholesterol 216 (A)   Triglycerides 74   HDL Cholesterol 67 (A)   VLDL Cholesterol 13   LDL Cholesterol  136 (A)   LDL/HDL Ratio 2.00   (A) Abnormal value            TSH    TSH 4/7/22   TSH 1.020                     Assessment and Plan   Diagnoses and all orders for this visit:    1. COVID-19 long hauler (Primary)  Assessment & Plan:  1.  Patient to continue working 8-hour shifts, advised against 12-hour shifts or overtime for now      2. Chronic obstructive pulmonary disease, unspecified COPD type (HCC)  Assessment & Plan:  1.  Continue Breztri 2 puffs twice daily  2.  Use albuterol and DuoNeb treatments as needed for dyspnea or wheezing  3.  Patient cannot afford pulmonary function test  4.  She is to keep follow-up appointment with pulmonology        3. Major depressive disorder, recurrent, moderate (HCC)  Assessment & Plan:  1.  Patient to take Lexapro 10 mg daily as prescribed, do not miss any doses      4. Hallucination, visual  Assessment & Plan:  1.  Check UA  2.  Check labs  3.  Discontinue Singulair  4.  Take Lexapro  daily as prescribed  5.  Follow-up in 4 weeks    Orders:  -     Urinalysis With Culture If Indicated - Urine, Clean Catch; Future  -     CBC & Differential  -     Basic Metabolic Panel  -     Urinalysis With Culture If Indicated - Urine, Clean Catch  -     TSH    5. Rash and nonspecific skin eruption  Assessment & Plan:  1.  Trial Lotrisone twice daily      Other orders  -     clotrimazole-betamethasone (Lotrisone) 1-0.05 % cream; Apply 1 application topically to the appropriate area as directed 2 (Two) Times a Day.  Dispense: 60 g; Refill: 1         I spent 30 minutes caring for Amy on this date of service. This time includes time spent by me in the following activities:preparing for the visit, reviewing tests, obtaining and/or reviewing a separately obtained history, performing a medically appropriate examination and/or evaluation , counseling and educating the patient/family/caregiver, ordering medications, tests, or procedures, documenting information in the medical record, independently interpreting results and communicating that information with the patient/family/caregiver and care coordination  Follow Up   Return in about 4 weeks (around 11/21/2022) for Hallucinations.  Patient was given instructions and counseling regarding her condition or for health maintenance advice. Please see specific information pulled into the AVS if appropriate.

## 2022-10-25 LAB
ANION GAP SERPL CALCULATED.3IONS-SCNC: 11.1 MMOL/L (ref 5–15)
BACTERIA UR QL AUTO: ABNORMAL /HPF
BASOPHILS # BLD AUTO: 0.08 10*3/MM3 (ref 0–0.2)
BASOPHILS NFR BLD AUTO: 0.6 % (ref 0–1.5)
BILIRUB UR QL STRIP: NEGATIVE
BUN SERPL-MCNC: 11 MG/DL (ref 6–20)
BUN/CREAT SERPL: 18.6 (ref 7–25)
CALCIUM SPEC-SCNC: 9.6 MG/DL (ref 8.6–10.5)
CHLORIDE SERPL-SCNC: 101 MMOL/L (ref 98–107)
CLARITY UR: CLEAR
CO2 SERPL-SCNC: 26.9 MMOL/L (ref 22–29)
COLOR UR: YELLOW
CREAT SERPL-MCNC: 0.59 MG/DL (ref 0.57–1)
DEPRECATED RDW RBC AUTO: 39.2 FL (ref 37–54)
EGFRCR SERPLBLD CKD-EPI 2021: 104.6 ML/MIN/1.73
EOSINOPHIL # BLD AUTO: 0.12 10*3/MM3 (ref 0–0.4)
EOSINOPHIL NFR BLD AUTO: 1 % (ref 0.3–6.2)
ERYTHROCYTE [DISTWIDTH] IN BLOOD BY AUTOMATED COUNT: 12.3 % (ref 12.3–15.4)
GLUCOSE SERPL-MCNC: 83 MG/DL (ref 65–99)
GLUCOSE UR STRIP-MCNC: NEGATIVE MG/DL
HCT VFR BLD AUTO: 39.1 % (ref 34–46.6)
HGB BLD-MCNC: 12.7 G/DL (ref 12–15.9)
HGB UR QL STRIP.AUTO: NEGATIVE
HYALINE CASTS UR QL AUTO: ABNORMAL /LPF
IMM GRANULOCYTES # BLD AUTO: 0.04 10*3/MM3 (ref 0–0.05)
IMM GRANULOCYTES NFR BLD AUTO: 0.3 % (ref 0–0.5)
KETONES UR QL STRIP: NEGATIVE
LEUKOCYTE ESTERASE UR QL STRIP.AUTO: ABNORMAL
LYMPHOCYTES # BLD AUTO: 4.67 10*3/MM3 (ref 0.7–3.1)
LYMPHOCYTES NFR BLD AUTO: 37.8 % (ref 19.6–45.3)
MCH RBC QN AUTO: 28.3 PG (ref 26.6–33)
MCHC RBC AUTO-ENTMCNC: 32.5 G/DL (ref 31.5–35.7)
MCV RBC AUTO: 87.1 FL (ref 79–97)
MONOCYTES # BLD AUTO: 0.84 10*3/MM3 (ref 0.1–0.9)
MONOCYTES NFR BLD AUTO: 6.8 % (ref 5–12)
NEUTROPHILS NFR BLD AUTO: 53.5 % (ref 42.7–76)
NEUTROPHILS NFR BLD AUTO: 6.62 10*3/MM3 (ref 1.7–7)
NITRITE UR QL STRIP: NEGATIVE
NRBC BLD AUTO-RTO: 0 /100 WBC (ref 0–0.2)
PH UR STRIP.AUTO: 5.5 [PH] (ref 5–8)
PLATELET # BLD AUTO: 338 10*3/MM3 (ref 140–450)
PMV BLD AUTO: 10.8 FL (ref 6–12)
POTASSIUM SERPL-SCNC: 4.4 MMOL/L (ref 3.5–5.2)
PROT UR QL STRIP: NEGATIVE
RBC # BLD AUTO: 4.49 10*6/MM3 (ref 3.77–5.28)
RBC # UR STRIP: ABNORMAL /HPF
REF LAB TEST METHOD: ABNORMAL
SODIUM SERPL-SCNC: 139 MMOL/L (ref 136–145)
SP GR UR STRIP: 1.01 (ref 1–1.03)
SQUAMOUS #/AREA URNS HPF: ABNORMAL /HPF
UROBILINOGEN UR QL STRIP: ABNORMAL
WBC # UR STRIP: ABNORMAL /HPF
WBC NRBC COR # BLD: 12.37 10*3/MM3 (ref 3.4–10.8)

## 2022-10-25 NOTE — PROGRESS NOTES
Please let patient know that her urine did not reflex to culture so no signs of UTI there.  Her CBC does indicate that she has or has had a recent viral illness.  Stop the Singulair and take the Lexapro daily and we will see how she feels in a few weeks.  If at any point, patient's hallucinations increase or worsen she should let me know immediately

## 2022-10-28 ENCOUNTER — APPOINTMENT (OUTPATIENT)
Dept: RESPIRATORY THERAPY | Facility: HOSPITAL | Age: 57
End: 2022-10-28

## 2022-11-01 ENCOUNTER — TELEPHONE (OUTPATIENT)
Dept: FAMILY MEDICINE CLINIC | Facility: CLINIC | Age: 57
End: 2022-11-01

## 2022-11-01 NOTE — TELEPHONE ENCOUNTER
Pt sent this message to my email:    Anna Marie  I didn’t go to work today I had a bad night and thought I was to go to lung Dr today but I must of accidentally canceled it on my chart. I have to have a Dr note or I’ll be wrote up. I don’t know if Dr Granger will give me one. I also was to let her know if I’m still seeing things, I am. Please let me know what I need to do about Lung Dr to.  Thank you Amy

## 2022-11-01 NOTE — TELEPHONE ENCOUNTER
Excuse patient from work today.  Did she discontinue the Singulair?  Is she taking the Lexapro every day?

## 2022-11-01 NOTE — TELEPHONE ENCOUNTER
Did patient miss work for the appointment or does she feel ill?  I cannot excuse her solely for a missed appointment.  Please make sure she has the number to reschedule.  If patient does not feel well related to breathing issues for which I have been seeing her, she may have an excuse for work today only

## 2022-11-01 NOTE — TELEPHONE ENCOUNTER
I want patient to try weaning off of the Lexapro, taking 1/2 tablet daily x5 days then every other day x5 days then stop.  If persist, we will discuss MRI brain at follow-up visit

## 2022-11-14 ENCOUNTER — OFFICE VISIT (OUTPATIENT)
Dept: PULMONOLOGY | Facility: HOSPITAL | Age: 57
End: 2022-11-14

## 2022-11-14 VITALS
WEIGHT: 199.6 LBS | HEART RATE: 75 BPM | BODY MASS INDEX: 31.33 KG/M2 | RESPIRATION RATE: 14 BRPM | OXYGEN SATURATION: 97 % | HEIGHT: 67 IN | SYSTOLIC BLOOD PRESSURE: 109 MMHG | DIASTOLIC BLOOD PRESSURE: 67 MMHG

## 2022-11-14 DIAGNOSIS — Z87.891 EX-SMOKER: ICD-10-CM

## 2022-11-14 DIAGNOSIS — U09.9 COVID-19 LONG HAULER: ICD-10-CM

## 2022-11-14 DIAGNOSIS — J44.9 CHRONIC OBSTRUCTIVE PULMONARY DISEASE, UNSPECIFIED COPD TYPE: ICD-10-CM

## 2022-11-14 DIAGNOSIS — R06.02 SHORTNESS OF BREATH: Primary | ICD-10-CM

## 2022-11-14 PROCEDURE — G0463 HOSPITAL OUTPT CLINIC VISIT: HCPCS

## 2022-11-14 RX ORDER — THEOPHYLLINE 300 MG/1
300 TABLET, EXTENDED RELEASE ORAL DAILY
Qty: 30 TABLET | Refills: 5 | Status: SHIPPED | OUTPATIENT
Start: 2022-11-14 | End: 2022-12-14

## 2022-11-14 NOTE — PROGRESS NOTES
Jimena Pyle is a 57 y.o. female.     History of Present Illness   57-year-old female referred due to shortness of breath post-COVID infection    Main problem is shortness of breath  Reports coughing which is mostly dry but occasionally with thick mucus    She has taken prednisone 10 mg daily for couple months but stopped when she started having visual hallucinations and her breathing got worse after she started      Past Medical History:   Diagnosis Date   • Acute right-sided low back pain without sciatica 3/2/2021   • Chronic obstructive pulmonary disease (HCC) 2/2/2021   • Constipation    • COVID-19 7/28/2022   • COVID-19 long hauler 8/15/2022   • Dysthymia 2/2/2021   • Hallucination, visual 10/24/2022   • Heart palpitations    • Leukocytosis 3/2/2021   • Mixed hyperlipidemia 3/2/2021     Current Outpatient Medications on File Prior to Visit   Medication Sig Dispense Refill   • albuterol sulfate  (90 Base) MCG/ACT inhaler Inhale 2 puffs Every 4 (Four) Hours As Needed for Wheezing. 1 g 2   • Budeson-Glycopyrrol-Formoterol (Breztri Aerosphere) 160-9-4.8 MCG/ACT aerosol inhaler Inhale 2 puffs 2 (Two) Times a Day. 8 g 2   • ipratropium-albuterol (DUO-NEB) 0.5-2.5 mg/3 ml nebulizer USE 3 ML VIA NEBULIZER EVERY 4 HOURS AS NEEDED FOR WHEEZING OR SHORTNESS OF BREATH 1620 mL 1   • nystatin (MYCOSTATIN) 415464 UNIT/GM cream Apply  topically to the appropriate area as directed As Needed (yeast). 30 g 1   • calcium carbonate (OS-SUBHASH) 600 MG tablet Take 600 mg by mouth Daily.     • cholecalciferol (Cholecalciferol) 25 MCG (1000 UT) tablet Take 2 tablets by mouth Daily. 60 tablet 2   • clotrimazole-betamethasone (Lotrisone) 1-0.05 % cream Apply 1 application topically to the appropriate area as directed 2 (Two) Times a Day. 60 g 1   • Docusate Sodium (DOCULASE PO) Take  by mouth.     • escitalopram (LEXAPRO) 10 MG tablet TAKE 1 TABLET BY MOUTH DAILY 30 tablet 1   • magnesium chloride ER 64 MG DR tablet  "Take  by mouth Daily.     • montelukast (SINGULAIR) 10 MG tablet TAKE 1 TABLET BY MOUTH EVERY NIGHT 90 tablet 1   • multivitamin with minerals tablet tablet Take 1 tablet by mouth Daily.     • mupirocin (BACTROBAN) 2 % ointment Apply 1 application topically to the appropriate area as directed 3 (Three) Times a Day. 22 g 0   • potassium bicarbonate (K-LYTE) 25 MEQ disintegrating tablet Take 25 mEq by mouth 2 (Two) Times a Day.     • vitamin C (ASCORBIC ACID) 250 MG tablet Take 250 mg by mouth Daily.     • zinc sulfate (ZINCATE) 220 (50 Zn) MG capsule Take 220 mg by mouth Daily.       No current facility-administered medications on file prior to visit.     Social History     Tobacco Use   • Smoking status: Former     Packs/day: 0.50     Years: 40.00     Pack years: 20.00     Types: Cigarettes     Quit date: 3/12/2022     Years since quittin.6     Passive exposure: Past   • Smokeless tobacco: Never   Vaping Use   • Vaping Use: Never used   Substance Use Topics   • Alcohol use: Yes     Comment: ocassionally    • Drug use: Never     Family History   Problem Relation Age of Onset   • Heart disease Mother    • Heart disease Father    • Diabetes Father        The following portions of the patient's history were reviewed and updated as appropriate: allergies, current medications, past family history, past medical history, past social history, past surgical history and problem list.    Review of Systems   Constitutional: Negative for chills, fever and positive for malaise/fatigue.   HENT: Negative.    Eyes: Negative.    Cardiovascular: Negative.    Respiratory: Positive for cough and shortness of breath.    Skin: Negative.    Musculoskeletal: Negative.    Gastrointestinal: Negative.    Genitourinary: Negative.    Neurological: Negative.    Psychiatric/Behavioral: Anxiety    Objective   Physical Exam  Blood pressure 109/67, pulse 75, resp. rate 14, height 170.2 cm (67\"), weight 90.5 kg (199 lb 9.6 oz), SpO2 97 " %.    General Appearance:  Alert   HEENT:  Normocephalic, without obvious abnormality, Conjunctiva/corneas clear,.   Nares normal, no drainage     Neck:  Supple, symmetrical, trachea midline. No JVD.  Lungs /Chest wall:   Bilateral wheezing, respirations unlabored, symmetrical wall movement.     Heart:  Regular rate and rhythm, S1 S2 normal  Abdomen: Soft, non-tender, no masses, no organomegaly.    Extremities: No edema, no clubbing or cyanosis    Assessment & Plan     Shortness of breath: Combination of COPD and long-haul COVID-19 infection  COPD  COVID-19 infection July 2022  Ex tobacco smoking: Quit March 2022    Plan:  Continue with Breztri 2 inhalations twice daily and DuoNeb as needed and albuterol inhaler as needed  Singulair 10 mg daily  Theophylline 300 mg daily  CT scan of the chest  Advised to stay up-to-date on vaccinations for flu, pneumococcal and COVID-19    3 m

## 2022-11-16 ENCOUNTER — OFFICE VISIT (OUTPATIENT)
Dept: FAMILY MEDICINE CLINIC | Facility: CLINIC | Age: 57
End: 2022-11-16

## 2022-11-16 VITALS
DIASTOLIC BLOOD PRESSURE: 80 MMHG | HEIGHT: 67 IN | BODY MASS INDEX: 31.23 KG/M2 | OXYGEN SATURATION: 98 % | SYSTOLIC BLOOD PRESSURE: 132 MMHG | HEART RATE: 70 BPM | WEIGHT: 199 LBS

## 2022-11-16 DIAGNOSIS — J44.9 CHRONIC OBSTRUCTIVE PULMONARY DISEASE, UNSPECIFIED COPD TYPE: Primary | ICD-10-CM

## 2022-11-16 DIAGNOSIS — U09.9 COVID-19 LONG HAULER: ICD-10-CM

## 2022-11-16 DIAGNOSIS — R41.3 MEMORY LOSS: ICD-10-CM

## 2022-11-16 DIAGNOSIS — F33.1 MAJOR DEPRESSIVE DISORDER, RECURRENT, MODERATE: ICD-10-CM

## 2022-11-16 DIAGNOSIS — R44.1 HALLUCINATION, VISUAL: ICD-10-CM

## 2022-11-16 PROCEDURE — 99214 OFFICE O/P EST MOD 30 MIN: CPT | Performed by: NURSE PRACTITIONER

## 2022-11-16 RX ORDER — DOXYCYCLINE HYCLATE 100 MG/1
100 CAPSULE ORAL 2 TIMES DAILY
Qty: 14 CAPSULE | Refills: 0 | Status: SHIPPED | OUTPATIENT
Start: 2022-11-16 | End: 2022-12-02

## 2022-11-16 RX ORDER — METHYLPREDNISOLONE 4 MG/1
TABLET ORAL
Qty: 21 TABLET | Refills: 0 | Status: SHIPPED | OUTPATIENT
Start: 2022-11-16

## 2022-11-16 RX ORDER — MONTELUKAST SODIUM 10 MG/1
10 TABLET ORAL NIGHTLY
Qty: 90 TABLET | Refills: 1 | Status: SHIPPED | OUTPATIENT
Start: 2022-11-16

## 2022-11-16 RX ORDER — QUETIAPINE FUMARATE 25 MG/1
25 TABLET, FILM COATED ORAL NIGHTLY
Qty: 30 TABLET | Refills: 1 | Status: SHIPPED | OUTPATIENT
Start: 2022-11-16

## 2022-11-16 RX ORDER — BUDESONIDE, GLYCOPYRROLATE, AND FORMOTEROL FUMARATE 160; 9; 4.8 UG/1; UG/1; UG/1
2 AEROSOL, METERED RESPIRATORY (INHALATION) 2 TIMES DAILY
Qty: 8 G | Refills: 2 | Status: SHIPPED | OUTPATIENT
Start: 2022-11-16

## 2022-11-16 NOTE — ASSESSMENT & PLAN NOTE
1.  Labs previously obtained were unremarkable  2.  We will keep patient off of Lexapro for now  3.  Start Seroquel 25 mg nightly  4.  Due to acute onset hallucinations, memory loss and right-sided visual deficit, I am ordering brain MRI

## 2022-11-16 NOTE — PROGRESS NOTES
"Chief Complaint  Follow-up (4 week follow up hallucinations ), Cough, Wheezing, and URI    Subjective        Amy Pyle presents to [facility]  History of Present Illness    Patient presents for f/u visit.    Patient seen on for f/u regarding complications after having COVID-19 in July.  Patient is using Breztri 2 puffs twice daily, Singulair 10 mg nightly and DuoNeb treatments added at last visit.  She was also referred to pulmonology and pulmonary function testing was ordered. PFT scheduled for last week and was going to cost $500 out of pocket so patient canceled. Patient seen by Dr. Foster. She was advised to continue Breztri, Duoneb treatments, Singulair 10mg daily and add Theophylline 300mg daily. A CT chest also ordered, not yet scheduled. Today, patient has increased cough/wheezing and exertional dyspnea.     Patient also c/o seeing things that are not there. She reports seeing things move across the floor and feels forgetful. Symptoms present for approximately 6 weeks. She described feeling depressed as well without anxiety. Singulair discontinued as was Lexapro to r/o as possible cause. Patient denies dizziness, headache, vision changes, slurred speech, or weakness. She is also having difficulty with her memory.  Patient reports some improvement in hallucinations since last visit but consistently feels that there is something/someone moving in the periphery of her right eye.     Objective   Vital Signs:  /80 (BP Location: Left arm, Patient Position: Sitting, Cuff Size: Adult)   Pulse 70   Ht 170.2 cm (67\")   Wt 90.3 kg (199 lb)   SpO2 98%   BMI 31.17 kg/m²   Estimated body mass index is 31.17 kg/m² as calculated from the following:    Height as of this encounter: 170.2 cm (67\").    Weight as of this encounter: 90.3 kg (199 lb).          Physical Exam  Constitutional:       Appearance: Normal appearance.   HENT:      Head: Normocephalic.   Eyes:      General: " Visual field deficit present.   Cardiovascular:      Rate and Rhythm: Normal rate and regular rhythm.   Pulmonary:      Effort: Pulmonary effort is normal.      Breath sounds: Normal breath sounds.   Abdominal:      General: Abdomen is flat. Bowel sounds are normal.      Palpations: Abdomen is soft.   Musculoskeletal:         General: Normal range of motion.      Cervical back: Neck supple.      Right lower leg: No edema.      Left lower leg: No edema.   Skin:     General: Skin is warm and dry.   Neurological:      Mental Status: She is alert and oriented to person, place, and time.      Cranial Nerves: No cranial nerve deficit or facial asymmetry.      Sensory: Sensation is intact.      Motor: Motor function is intact.      Coordination: Coordination is intact.      Gait: Gait is intact.   Psychiatric:         Attention and Perception: Attention normal.         Mood and Affect: Mood normal.         Speech: Speech normal.        Result Review :    CMP    CMP 4/7/22 10/24/22   Glucose 96 83   BUN 11 11   Creatinine 0.82 0.59   Sodium 141 139   Potassium 4.4 4.4   Chloride 102 101   Calcium 9.4 9.6   Albumin 4.50    Total Bilirubin 0.4    Alkaline Phosphatase 45    AST (SGOT) 13    ALT (SGPT) 14            CBC    CBC 4/7/22 6/24/22 10/24/22   WBC 12.62 (A) 9.20 12.37 (A)   RBC 4.60 4.94 4.49   Hemoglobin 12.7 13.9 12.7   Hematocrit 39.6 42.0 39.1   MCV 86.1 85.1 87.1   MCH 27.6 28.1 28.3   MCHC 32.1 33.1 32.5   RDW 12.6 13.4 12.3   Platelets 335 359 338   (A) Abnormal value            UA    Urinalysis 4/11/22 4/11/22 10/24/22 10/24/22    1621 1621 1530 1530   Squamous Epithelial Cells, UA  0-2  3-6 (A)   Specific [place], UA 1.017  1.011    Ketones, UA Negative  Negative    Blood, UA Negative  Negative    Leukocytes, UA Trace (A)  Trace (A)    Nitrite, UA Negative  Negative    RBC, UA  0-2  0-2   WBC, UA  0-2  3-5 (A)   Bacteria, UA  None Seen  None Seen   (A) Abnormal value                          Assessment and  Plan   Diagnoses and all orders for this visit:    1. Chronic obstructive pulmonary disease, unspecified COPD type (HCC) (Primary)  Assessment & Plan:  1.  Breztri 2 puffs twice daily  2.  DuoNeb treatments as needed for dyspnea or wheezing  3.  Restart Singulair 10 mg nightly  4.  Start theophylline as instructed per pulmonology  5.  Doxycycline 100 mg twice daily x7 days  6.  Medrol Dosepak  7.  Proceed with CT chest        2. Major depressive disorder, recurrent, moderate (HCC)    3. Hallucination, visual  Assessment & Plan:  1.  Labs previously obtained were unremarkable  2.  We will keep patient off of Lexapro for now  3.  Start Seroquel 25 mg nightly  4.  Due to acute onset hallucinations, memory loss and right-sided visual deficit, I am ordering brain MRI    Orders:  -     MRI Brain Without Contrast; Future    4. Memory loss  -     MRI Brain Without Contrast; Future    5. COVID-19 long hauler    Other orders  -     QUEtiapine (SEROquel) 25 MG tablet; Take 1 tablet by mouth Every Night.  Dispense: 30 tablet; Refill: 1  -     montelukast (SINGULAIR) 10 MG tablet; Take 1 tablet by mouth Every Night.  Dispense: 90 tablet; Refill: 1  -     methylPREDNISolone (MEDROL) 4 MG dose pack; Take as directed on package instructions.  Dispense: 21 tablet; Refill: 0  -     doxycycline (VIBRAMYCIN) 100 MG capsule; Take 1 capsule by mouth 2 (Two) Times a Day.  Dispense: 14 capsule; Refill: 0  -     Budeson-Glycopyrrol-Formoterol (Breztri Aerosphere) 160-9-4.8 MCG/ACT aerosol inhaler; Inhale 2 puffs 2 (Two) Times a Day.  Dispense: 8 g; Refill: 2         I spent 30 minutes caring for Amy on this date of service. This time includes time spent by me in the following activities:preparing for the visit, reviewing tests, obtaining and/or reviewing a separately obtained history, performing a medically appropriate examination and/or evaluation , counseling and educating the patient/family/caregiver, ordering medications, tests, or  procedures, documenting information in the medical record, independently interpreting results and communicating that information with the patient/family/caregiver and care coordination  Follow Up   Return in about 4 weeks (around 12/14/2022) for Hallucinations.  Patient was given instructions and counseling regarding her condition or for health maintenance advice. Please see specific information pulled into the AVS if appropriate.

## 2022-12-02 ENCOUNTER — OFFICE VISIT (OUTPATIENT)
Dept: FAMILY MEDICINE CLINIC | Facility: CLINIC | Age: 57
End: 2022-12-02

## 2022-12-02 VITALS — BODY MASS INDEX: 31.17 KG/M2 | OXYGEN SATURATION: 92 % | HEIGHT: 67 IN | HEART RATE: 79 BPM

## 2022-12-02 DIAGNOSIS — J44.1 CHRONIC OBSTRUCTIVE PULMONARY DISEASE WITH ACUTE EXACERBATION: ICD-10-CM

## 2022-12-02 DIAGNOSIS — R05.9 COUGH, UNSPECIFIED TYPE: Primary | ICD-10-CM

## 2022-12-02 LAB
EXPIRATION DATE: NORMAL
FLUAV AG UPPER RESP QL IA.RAPID: NOT DETECTED
FLUBV AG UPPER RESP QL IA.RAPID: NOT DETECTED
INTERNAL CONTROL: NORMAL
Lab: NORMAL
SARS-COV-2 RNA RESP QL NAA+PROBE: NOT DETECTED

## 2022-12-02 PROCEDURE — 99213 OFFICE O/P EST LOW 20 MIN: CPT | Performed by: NURSE PRACTITIONER

## 2022-12-02 PROCEDURE — 87636 SARSCOV2 & INF A&B AMP PRB: CPT | Performed by: NURSE PRACTITIONER

## 2022-12-02 RX ORDER — CEFDINIR 300 MG/1
300 CAPSULE ORAL 2 TIMES DAILY
Qty: 14 CAPSULE | Refills: 0 | Status: SHIPPED | OUTPATIENT
Start: 2022-12-02

## 2022-12-02 NOTE — PROGRESS NOTES
"Chief Complaint  Cough, URI, Generalized Body Aches, Shortness of Breath, and Wheezing    Subjective    {Problem List  Visit Diagnosis   Encounters  Notes  Medications  Labs  Result Review Imaging  Media :23}    Amy Pyle presents to [facility]  History of Present Illness    Patient presents with productive cough, body aches, chills and increasing wheezing/dyspnea.  She reports sent home from work yesterday due to fever.  Patient had a negative home COVID-19 test but her employer will not accept home test.    Objective   Vital Signs:  Pulse 79   Ht 170.2 cm (67\")   SpO2 92%   BMI 31.17 kg/m²   Estimated body mass index is 31.17 kg/m² as calculated from the following:    Height as of this encounter: 170.2 cm (67\").    Weight as of 11/16/22: 90.3 kg (199 lb).          Physical Exam  Constitutional:       Appearance: Normal appearance.   HENT:      Head: Normocephalic.   Cardiovascular:      Rate and Rhythm: Normal rate and regular rhythm.   Pulmonary:      Effort: Pulmonary effort is normal.      Breath sounds: Examination of the right-lower field reveals rhonchi. Examination of the left-lower field reveals rhonchi. Rhonchi present.   Abdominal:      General: Abdomen is flat. Bowel sounds are normal.      Palpations: Abdomen is soft.   Musculoskeletal:         General: Normal range of motion.      Cervical back: Neck supple.      Right lower leg: No edema.      Left lower leg: No edema.   Skin:     General: Skin is warm and dry.   Neurological:      Mental Status: She is alert and oriented to person, place, and time.      Gait: Gait is intact.   Psychiatric:         Attention and Perception: Attention normal.         Mood and Affect: Mood normal.         Speech: Speech normal.        Result Review :                Assessment and Plan   Diagnoses and all orders for this visit:    1. Cough, unspecified type (Primary)  -     Covid-19 + Flu A&B AG, Veritor    2. Chronic " obstructive pulmonary disease with acute exacerbation (HCC)  Assessment & Plan:  1.  Patient is negative for COVID-19 and flu A/B  2.  Continue inhalers as prescribed  3.  Finish Medrol Dosepak  4.  Start cefdinir 300 mg twice daily x7-day  5.  Work excuse for 12/1 and 12/2  6.  Call if symptoms persist or worsen      Other orders  -     cefdinir (OMNICEF) 300 MG capsule; Take 1 capsule by mouth 2 (Two) Times a Day.  Dispense: 14 capsule; Refill: 0         I spent 20 minutes caring for Amy on this date of service. This time includes time spent by me in the following activities:preparing for the visit, reviewing tests, obtaining and/or reviewing a separately obtained history, performing a medically appropriate examination and/or evaluation , counseling and educating the patient/family/caregiver, ordering medications, tests, or procedures, documenting information in the medical record and care coordination  Follow Up   Return if symptoms worsen or fail to improve.  Patient was given instructions and counseling regarding her condition or for health maintenance advice. Please see specific information pulled into the AVS if appropriate.       Answers for HPI/ROS submitted by the patient on 12/2/2022  Please describe your symptoms.: Headache, muscle aches, slight fever, chills, flu symptoms i think  Have you had these symptoms before?: Yes  How long have you been having these symptoms?: 1-4 days  Please list any medications you are currently taking for this condition.: On file  Please describe any probable cause for these symptoms. : Flu  What is the primary reason for your visit?: Other

## 2022-12-02 NOTE — ASSESSMENT & PLAN NOTE
1.  Patient is negative for COVID-19 and flu A/B  2.  Continue inhalers as prescribed  3.  Finish Medrol Dosepak  4.  Start cefdinir 300 mg twice daily x7-day  5.  Work excuse for 12/1 and 12/2  6.  Call if symptoms persist or worsen

## 2022-12-29 ENCOUNTER — TELEPHONE (OUTPATIENT)
Dept: FAMILY MEDICINE CLINIC | Facility: CLINIC | Age: 57
End: 2022-12-29

## 2022-12-29 NOTE — TELEPHONE ENCOUNTER
Attempted to call pt regarding TSH lab 10/24 needing to schedule appt to complete, no answer: per verbal left msg to call office.

## 2023-02-03 ENCOUNTER — HOSPITAL ENCOUNTER (EMERGENCY)
Facility: HOSPITAL | Age: 58
Discharge: HOME | End: 2023-02-03
Attending: EMERGENCY MEDICINE | Admitting: EMERGENCY MEDICINE
Payer: COMMERCIAL

## 2023-02-03 ENCOUNTER — APPOINTMENT (OUTPATIENT)
Dept: GENERAL RADIOLOGY | Facility: HOSPITAL | Age: 58
End: 2023-02-03
Payer: COMMERCIAL

## 2023-02-03 VITALS
SYSTOLIC BLOOD PRESSURE: 141 MMHG | WEIGHT: 200 LBS | HEART RATE: 83 BPM | OXYGEN SATURATION: 96 % | TEMPERATURE: 97.9 F | DIASTOLIC BLOOD PRESSURE: 80 MMHG | HEIGHT: 67 IN | RESPIRATION RATE: 18 BRPM | BODY MASS INDEX: 31.39 KG/M2

## 2023-02-03 DIAGNOSIS — J18.9 PNEUMONIA OF LOWER LOBE DUE TO INFECTIOUS ORGANISM, UNSPECIFIED LATERALITY: ICD-10-CM

## 2023-02-03 DIAGNOSIS — J90 PLEURAL EFFUSION: ICD-10-CM

## 2023-02-03 DIAGNOSIS — R07.1 CHEST PAIN ON BREATHING: Primary | ICD-10-CM

## 2023-02-03 LAB — QT INTERVAL: 385 MS

## 2023-02-03 PROCEDURE — EDLOS: Performed by: EMERGENCY MEDICINE

## 2023-02-03 PROCEDURE — 99284 EMERGENCY DEPT VISIT MOD MDM: CPT | Performed by: EMERGENCY MEDICINE

## 2023-02-03 PROCEDURE — G0463 HOSPITAL OUTPT CLINIC VISIT: HCPCS | Performed by: EMERGENCY MEDICINE

## 2023-02-03 PROCEDURE — 93005 ELECTROCARDIOGRAM TRACING: CPT | Performed by: EMERGENCY MEDICINE

## 2023-02-03 PROCEDURE — 71046 X-RAY EXAM CHEST 2 VIEWS: CPT

## 2023-02-03 PROCEDURE — 93010 ELECTROCARDIOGRAM REPORT: CPT | Performed by: EMERGENCY MEDICINE

## 2023-02-03 PROCEDURE — 99282 EMERGENCY DEPT VISIT SF MDM: CPT

## 2023-02-03 RX ORDER — AMOXICILLIN AND CLAVULANATE POTASSIUM 875; 125 MG/1; MG/1
1 TABLET, FILM COATED ORAL 2 TIMES DAILY
Qty: 20 TABLET | Refills: 0 | Status: SHIPPED | OUTPATIENT
Start: 2023-02-03 | End: 2023-02-13

## 2023-07-19 NOTE — ASSESSMENT & PLAN NOTE
1.  Start baclofen 10 mg 3 times daily.  2.  Rest  3.  Apply heat as needed  4.  Discussed proper lifting techniques  5.  Call with new or worsening symptoms   Cheek-To-Nose Interpolation Flap Text: A decision was made to reconstruct the defect utilizing an interpolation axial flap and a staged reconstruction.  A telfa template was made of the defect.  This telfa template was then used to outline the Cheek-To-Nose Interpolation flap.  The donor area for the pedicle flap was then injected with anesthesia.  The flap was excised through the skin and subcutaneous tissue down to the layer of the underlying musculature.  The interpolation flap was carefully excised within this deep plane to maintain its blood supply.  The edges of the donor site were undermined.   The donor site was closed in a primary fashion.  The pedicle was then rotated into position and sutured.  Once the tube was sutured into place, adequate blood supply was confirmed with blanching and refill.  The pedicle was then wrapped with xeroform gauze and dressed appropriately with a telfa and gauze bandage to ensure continued blood supply and protect the attached pedicle.

## 2023-09-18 ENCOUNTER — TRANSCRIBE ORDERS (OUTPATIENT)
Dept: PHYSICAL THERAPY | Facility: CLINIC | Age: 58
End: 2023-09-18
Payer: COMMERCIAL

## 2023-09-18 ENCOUNTER — TREATMENT (OUTPATIENT)
Dept: PHYSICAL THERAPY | Facility: CLINIC | Age: 58
End: 2023-09-18
Payer: OTHER MISCELLANEOUS

## 2023-09-18 DIAGNOSIS — M54.50 LUMBAR PAIN: Primary | ICD-10-CM

## 2023-09-18 DIAGNOSIS — M54.16 RADICULOPATHY, LUMBAR REGION: ICD-10-CM

## 2023-09-18 DIAGNOSIS — S33.9XXA SPRAIN OF UNSPECIFIED PARTS OF LUMBAR SPINE AND PELVIS, INITIAL ENCOUNTER: Primary | ICD-10-CM

## 2023-09-18 DIAGNOSIS — M54.18 RADICULOPATHY OF SACROCOCCYGEAL REGION: ICD-10-CM

## 2023-09-20 ENCOUNTER — TREATMENT (OUTPATIENT)
Dept: PHYSICAL THERAPY | Facility: CLINIC | Age: 58
End: 2023-09-20
Payer: OTHER MISCELLANEOUS

## 2023-09-20 DIAGNOSIS — M54.50 LUMBAR PAIN: Primary | ICD-10-CM

## 2023-09-20 NOTE — PROGRESS NOTES
Physical Therapy Daily Treatment Note      [facility] [place]              [address], [address]                [facility] [place]  [address]        Patient: Amy Pyle   : 1965  Diagnosis/ICD-10 Code:  Lumbar pain [M54.50]  Referring practitioner: DWAYNE Tolentino  Date of Initial Visit: Type: THERAPY  Noted: 2023  Today's Date: 2023  Patient seen for 2 sessions         Subjective    Amy Pyle reports: her back is better.  She said it still hurts but not like it did.  She is getting a little more mobile until she moves a certain way.  She rated her pain 5/10. She has been using her massager/pulsator with heat and that seems to have helped.           Objective   See Exercise, Manual, and Modality Logs for complete treatment.     Antalgic gait, guarded and slow transitional movements    (R) Thoracic and lumbar paraspinal and piriformis tightness, tenderness    Assessment/Plan  Initiated gentle ROM and strengthening.  Will issue for HEP next session based on tolerance.  Continued tightness as noted however pt was able to tolerate STM to address.  Continued with estim and ice for pain relief as well as added KT tape to help relax muscular tightness. Decreased pain reported at end of session however continued to amb with antalgic pattern.     Progress per Plan of Care           Timed:  Manual Therapy:    10     mins  27543;  Therapeutic Exercise:    15     mins  46904;     Neuromuscular Willy:        mins  07839;    Therapeutic Activity:          mins  93702;     Gait Training:           mins  25753;     Ultrasound:          mins  21333;     Work Conditioning/Hardening (initial 2 hours)        mins  80223  Work Conditioning/Hardening (each add'l hour)        mins  31563    Untimed:   Electrical Stimulation:    15     mins  29256 ( );  Traction          mins 97694    Timed Treatment:   25   mins   Total Treatment:     40   mins    Claudia Samayoa PTA  Physical Therapist Assistant

## 2023-09-22 ENCOUNTER — TREATMENT (OUTPATIENT)
Dept: PHYSICAL THERAPY | Facility: CLINIC | Age: 58
End: 2023-09-22
Payer: OTHER MISCELLANEOUS

## 2023-09-22 DIAGNOSIS — M54.50 LUMBAR PAIN: Primary | ICD-10-CM

## 2023-09-22 PROCEDURE — 97014 ELECTRIC STIMULATION THERAPY: CPT | Performed by: PHYSICAL THERAPIST

## 2023-09-22 PROCEDURE — 97110 THERAPEUTIC EXERCISES: CPT | Performed by: PHYSICAL THERAPIST

## 2023-09-22 PROCEDURE — 97140 MANUAL THERAPY 1/> REGIONS: CPT | Performed by: PHYSICAL THERAPIST

## 2023-09-22 NOTE — PROGRESS NOTES
Physical Therapy Daily Treatment Note      [facility] [place]              [address], [address]                [facility] [place]  [address]        Patient: Amy Pyle   : 1965  Diagnosis/ICD-10 Code:  Lumbar pain [M54.50]  Referring practitioner: DWAYNE Tolentino  Date of Initial Visit: Type: THERAPY  Noted: 2023  Today's Date: 2023  Patient seen for 3 sessions         Subjective    Amy Pyle reports: her pain is 3.5/10.  She still has sensation with trying to take a big step with the (R)  or if she tries to turn and reach to the (L).  She said the tape really helped.            Objective   See Exercise, Manual, and Modality Logs for complete treatment.     Tenderness and tightness lumbar and thoracic paraspinals, sacral region; thoracic and sacral tighter than lumbar    Assessment/Plan  Continued with gentle exercises to focus on pain control.  Added hamstring stretch and self pelvic mobs as pt with increased sacral tightness and tenderness today.  She required cues and education for these progressions.  Continued with STM/IASTM to address soft tissue dysfunction as well as utilized estim and ice.  Will continue to monitor symptoms and progress as able.  Educated on application of KT tape.     Progress per Plan of Care           Timed:  Manual Therapy:    10     mins  58903;  Therapeutic Exercise:    20     mins  33599;     Neuromuscular Willy:        mins  73388;    Therapeutic Activity:          mins  62909;     Gait Training:           mins  49275;     Ultrasound:          mins  68004;     Work Conditioning/Hardening (initial 2 hours)        mins  61503  Work Conditioning/Hardening (each add'l hour)        mins  55933    Untimed:   Electrical Stimulation:    15     mins  71288 ( );  Traction          mins 55139    Timed Treatment:   30   mins   Total Treatment:     45   mins    Claudia Samayoa PTA  Physical Therapist Assistant

## 2023-09-25 ENCOUNTER — TREATMENT (OUTPATIENT)
Dept: PHYSICAL THERAPY | Facility: CLINIC | Age: 58
End: 2023-09-25

## 2023-09-25 DIAGNOSIS — M54.50 LUMBAR PAIN: Primary | ICD-10-CM

## 2023-09-25 PROCEDURE — 97140 MANUAL THERAPY 1/> REGIONS: CPT | Performed by: PHYSICAL THERAPIST

## 2023-09-25 PROCEDURE — G0283 ELEC STIM OTHER THAN WOUND: HCPCS | Performed by: PHYSICAL THERAPIST

## 2023-09-25 PROCEDURE — 97110 THERAPEUTIC EXERCISES: CPT | Performed by: PHYSICAL THERAPIST

## 2023-09-25 NOTE — PROGRESS NOTES
Physical Therapy Daily Treatment Note      [facility] [place]              [address], [address]                [place]  [address]        Patient: Amy Pyle   : 1965  Diagnosis/ICD-10 Code:  Lumbar pain [M54.50]  Referring practitioner: DWAYNE Tolentino  Date of Initial Visit: Type: THERAPY  Noted: 2023  Today's Date: 2023  Patient seen for 4 sessions         Subjective    Amy Pyle reports: she was doing better until she had to  her 16 month old grandson.  She rated her pain 4/10 today.            Objective   See Exercise, Manual, and Modality Logs for complete treatment.     (R) Lumbar, thoracic, and sacral paraspinals, QL, and piriformis tightness and tenderness    Assessment/Plan  Gentle progression of core strengthening however closely monitored tolerance d/t aggravation of symptoms over the weekend.  Discomfort noted with exercises and SKTC was discontinued d/t cramping in back.  Continued tightness and tenderness noted with palpation and was greater than last session thus continued with STM.  Held IASTM d/t significant tenderness.  Also continued with estim and KT tape for pain control however trial of heat instead of ice.  Will continue to progress strengthening as tolerated including marches next session.     Progress per Plan of Care           Timed:  Manual Therapy:    10     mins  07555;  Therapeutic Exercise:    23     mins  23316;     Neuromuscular Willy:        mins  59047;    Therapeutic Activity:          mins  34361;     Gait Training:           mins  91157;     Ultrasound:          mins  84110;     Work Conditioning/Hardening (initial 2 hours)        mins  69319  Work Conditioning/Hardening (each add'l hour)        mins  95926    Untimed:   Electrical Stimulation:    15     mins  14140 ( );  Traction          mins 99450    Timed Treatment:   33   mins   Total Treatment:     48   mins    Claudia Samayoa PTA  Physical Therapist Assistant

## 2023-10-06 ENCOUNTER — TREATMENT (OUTPATIENT)
Dept: PHYSICAL THERAPY | Facility: CLINIC | Age: 58
End: 2023-10-06
Payer: OTHER MISCELLANEOUS

## 2023-10-06 DIAGNOSIS — M54.50 LUMBAR PAIN: Primary | ICD-10-CM

## 2023-10-06 NOTE — PROGRESS NOTES
Physical Therapy Daily Treatment Note      [facility]              [address], [address]                [place]  [address]        Patient: Amy Pyle   : 1965  Diagnosis/ICD-10 Code:  Lumbar pain [M54.50]  Referring practitioner: DWAYNE Tolentino  Date of Initial Visit: Type: THERAPY  Noted: 2023  Today's Date: 10/6/2023  Patient seen for 5 sessions         Subjective    Amy Pyle reports: her back is lots better.  She said she still has the sensation but the pain across is less.  She got steroids last week and that helped a lot.  She rated her pain 2/10.  She still bets burning with standing for long periods.           Objective   See Exercise, Manual, and Modality Logs for complete treatment.     Tightness in mid thoracic to upper lumbar paraspinals    Assessment/Plan  Progressed strengthening exercises as noted with good tolerance.  Added standing exercises with UE movements d/t tightness in thoracic paraspinals.  Continued with manual and KT tape to address tightness.  Will monitor and progress as able next session.      Progress per Plan of Care           Timed:  Manual Therapy:    8     mins  32923;  Therapeutic Exercise:    27     mins  21225;     Neuromuscular Willy:        mins  16981;    Therapeutic Activity:     5     mins  24254;     Gait Training:           mins  32324;     Ultrasound:          mins  44139;     Work Conditioning/Hardening (initial 2 hours)        mins  42049  Work Conditioning/Hardening (each add'l hour)        mins  92074    Untimed:   Electrical Stimulation:    15     mins  47813 (MC );  Traction          mins 01649    Timed Treatment:   40   mins   Total Treatment:     55   mins    Claudia Samayoa PTA  Physical Therapist Assistant

## 2023-10-09 ENCOUNTER — TREATMENT (OUTPATIENT)
Dept: PHYSICAL THERAPY | Facility: CLINIC | Age: 58
End: 2023-10-09
Payer: OTHER MISCELLANEOUS

## 2023-10-09 DIAGNOSIS — M54.50 LUMBAR PAIN: Primary | ICD-10-CM

## 2023-10-09 PROCEDURE — 97110 THERAPEUTIC EXERCISES: CPT | Performed by: PHYSICAL THERAPIST

## 2023-10-09 PROCEDURE — 97140 MANUAL THERAPY 1/> REGIONS: CPT | Performed by: PHYSICAL THERAPIST

## 2023-10-09 NOTE — PROGRESS NOTES
Physical Therapy Daily Treatment Note      [facility]              [address], [address]                [place]  [address]        Patient: Amy Pyle   : 1965  Diagnosis/ICD-10 Code:  Lumbar pain [M54.50]  Referring practitioner: DWAYNE Tolentino  Date of Initial Visit: Type: THERAPY  Noted: 2023  Today's Date: 10/9/2023  Patient seen for 6 sessions         Subjective    Amy Pyle reports: her back is feeling pretty good.  She said she left the tape on.  She is not 100% but she can still feel it with moving certain ways.  No pain at rest.            Objective   See Exercise, Manual, and Modality Logs for complete treatment.     Tightness lumbar/thoracic paraspinals.    Assessment/Plan  Persistent tightness in lumbar and lower thoracic paraspinals.  Continued to address with STM.  Progressed exercises as noted however increased pain to 3/10.  Discontinued exercises and session.  Pt denied modalities as the last couple times it has bothered her hip.     Progress per Plan of Care           Timed:  Manual Therapy:    8     mins  99191;  Therapeutic Exercise:    23     mins  05661;     Neuromuscular Willy:        mins  07397;    Therapeutic Activity:          mins  85617;     Gait Training:           mins  91116;     Ultrasound:          mins  53717;     Work Conditioning/Hardening (initial 2 hours)        mins  27691  Work Conditioning/Hardening (each add'l hour)        mins  21333    Untimed:   Electrical Stimulation:         mins  89858 ( );  Traction          mins 75552    Timed Treatment:   31   mins   Total Treatment:     31   mins    Claudia Samayoa PTA  Physical Therapist Assistant

## 2023-10-11 ENCOUNTER — TREATMENT (OUTPATIENT)
Dept: PHYSICAL THERAPY | Facility: CLINIC | Age: 58
End: 2023-10-11
Payer: OTHER MISCELLANEOUS

## 2023-10-11 DIAGNOSIS — M54.50 LUMBAR PAIN: Primary | ICD-10-CM

## 2023-10-11 NOTE — PROGRESS NOTES
Physical Therapy Daily Treatment Note      [facility] [place]              [address], [address]                [facility] [place]  [address]        Patient: Amy Pyle   : 1965  Diagnosis/ICD-10 Code:  Lumbar pain [M54.50]  Referring practitioner: DWAYNE Tolentino  Date of Initial Visit: Type: THERAPY  Noted: 2023  Today's Date: 10/11/2023  Patient seen for 7 sessions         Subjective    Amy Pyle reports: she had back spasms today.  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  It required some twisting and bending.             Objective   See Exercise, Manual, and Modality Logs for complete treatment.     Tightness lumbar/thoracic paraspinals and QL    Assessment/Plan  Continued tightness lumbar/thoracic paraspinals however decreased and more proximal to spine compared to last session.  Continued to focus on core strengthening and stability.  No progressions to exercises today d/t reports of cramping while at work.  No complaints with exercises today.     Progress per Plan of Care; Pt to return to MD and hopes he will release her back to full duty as she thinks bending over is bothering her.           Timed:  Manual Therapy:    8     mins  27353;  Therapeutic Exercise:    25     mins  67514;     Neuromuscular Willy:        mins  49637;    Therapeutic Activity:          mins  52465;     Gait Training:           mins  73690;     Ultrasound:          mins  65494;     Work Conditioning/Hardening (initial 2 hours)        mins  76334  Work Conditioning/Hardening (each add'l hour)        mins  64694    Untimed:   Electrical Stimulation:         mins  66613 ( );  Traction          mins 18031    Timed Treatment:   33   mins   Total Treatment:     33   mins    Claudia Samayoa PTA  Physical Therapist Assistant

## 2023-11-14 ENCOUNTER — OFFICE VISIT (OUTPATIENT)
Dept: FAMILY MEDICINE CLINIC | Facility: CLINIC | Age: 58
End: 2023-11-14
Payer: COMMERCIAL

## 2023-11-14 VITALS
BODY MASS INDEX: 29.98 KG/M2 | SYSTOLIC BLOOD PRESSURE: 124 MMHG | DIASTOLIC BLOOD PRESSURE: 80 MMHG | HEIGHT: 67 IN | OXYGEN SATURATION: 98 % | HEART RATE: 73 BPM | WEIGHT: 191 LBS

## 2023-11-14 DIAGNOSIS — F33.1 MODERATE EPISODE OF RECURRENT MAJOR DEPRESSIVE DISORDER: ICD-10-CM

## 2023-11-14 DIAGNOSIS — J44.9 CHRONIC OBSTRUCTIVE PULMONARY DISEASE, UNSPECIFIED COPD TYPE: ICD-10-CM

## 2023-11-14 DIAGNOSIS — Z00.00 PREVENTATIVE HEALTH CARE: ICD-10-CM

## 2023-11-14 DIAGNOSIS — U09.9 COVID-19 LONG HAULER: Primary | ICD-10-CM

## 2023-11-14 DIAGNOSIS — K76.9 LIVER LESION: ICD-10-CM

## 2023-11-14 PROCEDURE — 85027 COMPLETE CBC AUTOMATED: CPT | Performed by: NURSE PRACTITIONER

## 2023-11-14 PROCEDURE — 83036 HEMOGLOBIN GLYCOSYLATED A1C: CPT | Performed by: NURSE PRACTITIONER

## 2023-11-14 PROCEDURE — 84443 ASSAY THYROID STIM HORMONE: CPT | Performed by: NURSE PRACTITIONER

## 2023-11-14 PROCEDURE — 80061 LIPID PANEL: CPT | Performed by: NURSE PRACTITIONER

## 2023-11-14 PROCEDURE — 80053 COMPREHEN METABOLIC PANEL: CPT | Performed by: NURSE PRACTITIONER

## 2023-11-14 RX ORDER — SERTRALINE HYDROCHLORIDE 25 MG/1
25 TABLET, FILM COATED ORAL DAILY
Qty: 30 TABLET | Refills: 1 | Status: SHIPPED | OUTPATIENT
Start: 2023-11-14

## 2023-11-14 NOTE — PROGRESS NOTES
Venipuncture Blood Specimen Collection  Venipuncture performed in the right arm by Anna Marie Keith MA with good hemostasis. Patient tolerated the procedure well without complications.   11/14/23   Anna Marie Keith MA

## 2023-11-14 NOTE — PROGRESS NOTES
Chief Complaint  Follow-up (Follow up from MRI results. Showing liver lesions. )    Subjective        Amy Pyle presents to [facility]  History of Present Illness    Patient presents with concerns regarding recent MRI.  She reports injured at work on 9/13 and has been seeing [facility] occupational medicine. Patient completed physical therapy without relief. She is also using a TENS unit.  MRI of L-spine without contrast was completed on 11/6.  The findings are as follows: 1.  Lumbar spondylosis with multilevel neural foraminal stenosis and mild spinal canal stenosis at L3-L4. 2.  Complex/septated cystic lesion is seen anteriorly within the left renal midpole.  Numerous tiny T2 hyperintense liver lesions, possibly cysts.  Further evaluation with three-phase CT of the abdomen may be beneficial. Patent has been referred to Pain Management.     Patient has COPD with COVID-19 long hauler's - prescribed Breztri 2 puffs BID and Duoneb treatments PRN. She is followed by Pulmonology.     Patient has hx major depression. She has tried and failed Lexapro. Patient admits to feeling that she may be off dead or her family would be better without her but denies any suicidal ideation or thoughts of self harm.    PHQ-9 Depression Screening  Little interest or pleasure in doing things? 3-->nearly every day   Feeling down, depressed, or hopeless? 3-->nearly every day   Trouble falling or staying asleep, or sleeping too much? 3-->nearly every day   Feeling tired or having little energy? 3-->nearly every day   Poor appetite or overeating? 3-->nearly every day   Feeling bad about yourself - or that you are a failure or have let yourself or your family down? 3-->nearly every day   Trouble concentrating on things, such as reading the newspaper or watching television? 3-->nearly every day   Moving or speaking so slowly that other people could have noticed? Or the opposite - being so fidgety or  "restless that you have been moving around a lot more than usual? 0-->not at all   Thoughts that you would be better off dead, or of hurting yourself in some way? 2-->more than half the days   PHQ-9 Total Score 23   If you checked off any problems, how difficult have these problems made it for you to do your work, take care of things at home, or get along with other people? somewhat difficult      Objective   Vital Signs:  /80 (BP Location: Left arm, Patient Position: Sitting, Cuff Size: Adult)   Pulse 73   Ht 170.2 cm (67\")   Wt 86.6 kg (191 lb)   SpO2 98%   BMI 29.91 kg/m²   Estimated body mass index is 29.91 kg/m² as calculated from the following:    Height as of this encounter: 170.2 cm (67\").    Weight as of this encounter: 86.6 kg (191 lb).               Physical Exam  Constitutional:       Appearance: Normal appearance.   HENT:      Head: Normocephalic.   Cardiovascular:      Rate and Rhythm: Normal rate and regular rhythm.   Pulmonary:      Effort: Pulmonary effort is normal.      Breath sounds: Normal breath sounds.   Abdominal:      General: Abdomen is flat. Bowel sounds are normal.      Palpations: Abdomen is soft.   Musculoskeletal:         General: Normal range of motion.      Cervical back: Neck supple.      Right lower leg: No edema.      Left lower leg: No edema.   Skin:     General: Skin is warm and dry.   Neurological:      Mental Status: She is alert and oriented to person, place, and time.      Gait: Gait is intact.   Psychiatric:         Attention and Perception: Attention normal.         Mood and Affect: Mood normal. Affect is tearful.         Speech: Speech normal.        Result Review :                   Assessment and Plan   Diagnoses and all orders for this visit:    1. COVID-19 long hauler (Primary)    2. Chronic obstructive pulmonary disease, unspecified COPD type  Assessment & Plan:  Continue Breztri as prescribed  DuoNeb treatments as needed        3. Preventative health " care  -     CBC (No Diff)  -     Comprehensive Metabolic Panel  -     Hemoglobin A1c  -     Lipid Panel  -     TSH    4. Liver lesion  Assessment & Plan:  CT scan abdomen ordered to further evaluation liver lesion  MRI results reviewed    Orders:  -     CT Abdomen With & Without Contrast; Future    5. Moderate episode of recurrent major depressive disorder  Assessment & Plan:  Start Zoloft 25 mg daily  Reviewed possible side effects of SSRIs with patient  She should call with any worsening symptoms of depression or anxiety  Follow-up in 6 weeks, call sooner if needed      Other orders  -     sertraline (Zoloft) 25 MG tablet; Take 1 tablet by mouth Daily.  Dispense: 30 tablet; Refill: 1           I spent 30 minutes caring for Amy on this date of service. This time includes time spent by me in the following activities:preparing for the visit, reviewing tests, obtaining and/or reviewing a separately obtained history, performing a medically appropriate examination and/or evaluation , counseling and educating the patient/family/caregiver, ordering medications, tests, or procedures, documenting information in the medical record, and care coordination  Follow Up   Return in about 6 weeks (around 12/26/2023) for Depression/Liver Lesion/COPD.  Patient was given instructions and counseling regarding her condition or for health maintenance advice. Please see specific information pulled into the AVS if appropriate.

## 2023-11-14 NOTE — ASSESSMENT & PLAN NOTE
Start Zoloft 25 mg daily  Reviewed possible side effects of SSRIs with patient  She should call with any worsening symptoms of depression or anxiety  Follow-up in 6 weeks, call sooner if needed

## 2023-11-15 LAB
ALBUMIN SERPL-MCNC: 4.7 G/DL (ref 3.5–5.2)
ALBUMIN/GLOB SERPL: 1.8 G/DL
ALP SERPL-CCNC: 41 U/L (ref 39–117)
ALT SERPL W P-5'-P-CCNC: 9 U/L (ref 1–33)
ANION GAP SERPL CALCULATED.3IONS-SCNC: 10.1 MMOL/L (ref 5–15)
AST SERPL-CCNC: 12 U/L (ref 1–32)
BILIRUB SERPL-MCNC: 0.4 MG/DL (ref 0–1.2)
BUN SERPL-MCNC: 10 MG/DL (ref 8–23)
BUN/CREAT SERPL: 14.9 (ref 7–25)
CALCIUM SPEC-SCNC: 9.6 MG/DL (ref 8.6–10.5)
CHLORIDE SERPL-SCNC: 102 MMOL/L (ref 98–107)
CHOLEST SERPL-MCNC: 255 MG/DL (ref 0–200)
CO2 SERPL-SCNC: 24.9 MMOL/L (ref 22–29)
CREAT SERPL-MCNC: 0.67 MG/DL (ref 0.57–1)
DEPRECATED RDW RBC AUTO: 37 FL (ref 37–54)
EGFRCR SERPLBLD CKD-EPI 2021: 100.2 ML/MIN/1.73
ERYTHROCYTE [DISTWIDTH] IN BLOOD BY AUTOMATED COUNT: 12.4 % (ref 12.3–15.4)
GLOBULIN UR ELPH-MCNC: 2.6 GM/DL
GLUCOSE SERPL-MCNC: 89 MG/DL (ref 65–99)
HBA1C MFR BLD: 5.3 % (ref 4.8–5.6)
HCT VFR BLD AUTO: 39.2 % (ref 34–46.6)
HDLC SERPL-MCNC: 69 MG/DL (ref 40–60)
HGB BLD-MCNC: 13.4 G/DL (ref 12–15.9)
LDLC SERPL CALC-MCNC: 173 MG/DL (ref 0–100)
LDLC/HDLC SERPL: 2.48 {RATIO}
MCH RBC QN AUTO: 28.8 PG (ref 26.6–33)
MCHC RBC AUTO-ENTMCNC: 34.2 G/DL (ref 31.5–35.7)
MCV RBC AUTO: 84.1 FL (ref 79–97)
PLATELET # BLD AUTO: 293 10*3/MM3 (ref 140–450)
PMV BLD AUTO: 11.1 FL (ref 6–12)
POTASSIUM SERPL-SCNC: 4.5 MMOL/L (ref 3.5–5.2)
PROT SERPL-MCNC: 7.3 G/DL (ref 6–8.5)
RBC # BLD AUTO: 4.66 10*6/MM3 (ref 3.77–5.28)
SODIUM SERPL-SCNC: 137 MMOL/L (ref 136–145)
TRIGL SERPL-MCNC: 76 MG/DL (ref 0–150)
TSH SERPL DL<=0.05 MIU/L-ACNC: 0.66 UIU/ML (ref 0.27–4.2)
VLDLC SERPL-MCNC: 13 MG/DL (ref 5–40)
WBC NRBC COR # BLD: 10.52 10*3/MM3 (ref 3.4–10.8)

## 2023-12-05 ENCOUNTER — TELEPHONE (OUTPATIENT)
Dept: PAIN MEDICINE | Facility: CLINIC | Age: 58
End: 2023-12-05
Payer: COMMERCIAL

## 2023-12-05 NOTE — TELEPHONE ENCOUNTER
DR. BRUNSON HAS BEEN SPEAKING WITH A  WORK COMP MD AND TOLD ME TO SET UP A NEW PATIENT APPT FOR PATIENT. LM FOR PATIENT TO CALL BACK TO SCHEDULE.

## 2023-12-06 NOTE — TELEPHONE ENCOUNTER
PATIENT RETURNING CALL TO [facility] REGARDING SCHEDULING. HUB ATTEMPTED TO WARM TRANSFER BUT WAS UNSUCCESSFUL. PT STATED TO GO AHEAD AND SCHEDULE HER A TIME AND DATE AND LEAVE HER A VOICE MAIL WITH THE APPT INFO AND SHE WILL MAKE IT WORK.

## 2023-12-07 ENCOUNTER — OFFICE VISIT (OUTPATIENT)
Dept: PAIN MEDICINE | Facility: CLINIC | Age: 58
End: 2023-12-07
Payer: OTHER MISCELLANEOUS

## 2023-12-07 ENCOUNTER — TELEPHONE (OUTPATIENT)
Dept: PAIN MEDICINE | Facility: CLINIC | Age: 58
End: 2023-12-07
Payer: COMMERCIAL

## 2023-12-07 VITALS
HEART RATE: 74 BPM | RESPIRATION RATE: 16 BRPM | SYSTOLIC BLOOD PRESSURE: 122 MMHG | DIASTOLIC BLOOD PRESSURE: 82 MMHG | OXYGEN SATURATION: 97 %

## 2023-12-07 DIAGNOSIS — M54.16 LUMBAR RADICULOPATHY: Primary | ICD-10-CM

## 2023-12-07 DIAGNOSIS — N28.89 RENAL MASS: Primary | ICD-10-CM

## 2023-12-07 DIAGNOSIS — F41.9 ANXIETY: ICD-10-CM

## 2023-12-07 DIAGNOSIS — M99.53 INTERVERTEBRAL DISC STENOSIS OF NEURAL CANAL OF LUMBAR REGION: ICD-10-CM

## 2023-12-07 PROCEDURE — G0463 HOSPITAL OUTPT CLINIC VISIT: HCPCS | Performed by: STUDENT IN AN ORGANIZED HEALTH CARE EDUCATION/TRAINING PROGRAM

## 2023-12-07 RX ORDER — CYCLOBENZAPRINE HCL 10 MG
TABLET ORAL
COMMUNITY
Start: 2023-11-29

## 2023-12-07 RX ORDER — MELOXICAM 15 MG/1
1 TABLET ORAL DAILY
COMMUNITY
Start: 2023-11-29 | End: 2023-12-07 | Stop reason: SDUPTHER

## 2023-12-07 RX ORDER — DIAZEPAM 10 MG/1
10 TABLET ORAL ONCE
Qty: 1 TABLET | Refills: 0 | Status: SHIPPED | OUTPATIENT
Start: 2023-12-07 | End: 2023-12-07

## 2023-12-07 RX ORDER — GABAPENTIN 100 MG/1
100 CAPSULE ORAL 3 TIMES DAILY
Qty: 60 CAPSULE | Refills: 0 | Status: SHIPPED | OUTPATIENT
Start: 2023-12-07

## 2023-12-07 RX ORDER — MELOXICAM 15 MG/1
15 TABLET ORAL DAILY
Qty: 30 TABLET | Refills: 1 | Status: SHIPPED | OUTPATIENT
Start: 2023-12-07

## 2023-12-07 NOTE — TELEPHONE ENCOUNTER
Caller: GOLDIE    Relationship: WORKERS [employer]       What form or medical record are you requesting: JAVON NOTES, WORK STATUS AND MESFIN IF AVAILABLE    How would you like to receive the form or medical records (pick-up, mail, fax): FAX  If fax, what is the fax number: [phone]

## 2023-12-07 NOTE — PROGRESS NOTES
CHIEF COMPLAINT  Chief Complaint   Patient presents with    Back Pain     New Patient- No Narcotics       Primary Care  Mariaelena Granger APRN Subjective   Amy Pyle is a 58 y.o. female  who presents for chronic radicular low back pain.  She reports that several months ago, she was injured while she was working.  She reports lifting a heavy pallet of supplies and felt a popping sensation.  Since that time, she has had significant radicular back pain especially into the right leg including the anterior thigh and lateral calf.  She reports that while some of her pain has improved over time, she is left with fairly significant back pain which makes it difficult to walk and be physically active.  She denies any profound numbness or weakness.  She did undergo an MRI which shows a very significant L3-4 disc herniation which likely explains the majority of her pain.  She has tried conservative treatment including meloxicam and cyclobenzaprine with only limited benefit.  She has tried home exercise and physical therapy with limited benefit.    History of Present Illness     Location: Low back with radiation to the right lower extremity  Onset: Several months ago  Duration: Slightly improved  Timing: Constant throughout the day  Quality: Sharp, aching, stabbing  Severity: Today: 4       Last Week: 4       Worst: 8  Modifying Factors: The pain is worse with physical activity and movement and walking and slightly improved with sitting and resting  Functional Deficit: The pain makes it difficult for her to work her fairly active job    Physical Therapy: yes    Interval Update 12/07/2023:     The following portions of the patient's history were reviewed and updated as appropriate: allergies, current medications, past family history, past medical history, past social history, past surgical history, and problem list.    Procedures:        Current Outpatient Medications:     albuterol sulfate  (90 Base) MCG/ACT  inhaler, Inhale 2 puffs Every 4 (Four) Hours As Needed for Wheezing., Disp: 1 g, Rfl: 2    Budeson-Glycopyrrol-Formoterol (Breztri Aerosphere) 160-9-4.8 MCG/ACT aerosol inhaler, Inhale 2 puffs 2 (Two) Times a Day., Disp: 8 g, Rfl: 2    cyclobenzaprine (FLEXERIL) 10 MG tablet, TAKE 1 TABLET BY MOUTH EVERY 8 HOURS AS NEEDED FOR SPASMS, Disp: , Rfl:     ipratropium-albuterol (DUO-NEB) 0.5-2.5 mg/3 ml nebulizer, USE 3 ML VIA NEBULIZER EVERY 4 HOURS AS NEEDED FOR WHEEZING OR SHORTNESS OF BREATH, Disp: 1620 mL, Rfl: 1    meloxicam (MOBIC) 15 MG tablet, Take 1 tablet by mouth Daily., Disp: 30 tablet, Rfl: 1    nystatin (MYCOSTATIN) 687588 UNIT/GM cream, Apply  topically to the appropriate area as directed As Needed (yeast)., Disp: 30 g, Rfl: 1    sertraline (Zoloft) 25 MG tablet, Take 1 tablet by mouth Daily., Disp: 30 tablet, Rfl: 1    diazePAM (Valium) 10 MG tablet, Take 1 tablet by mouth 1 (One) Time for 1 dose., Disp: 1 tablet, Rfl: 0    gabapentin (NEURONTIN) 100 MG capsule, Take 1 capsule by mouth 3 (Three) Times a Day., Disp: 60 capsule, Rfl: 0    Review of Systems   Musculoskeletal:  Positive for arthralgias, back pain, gait problem and myalgias. Negative for joint swelling, neck pain and neck stiffness.   Neurological:  Positive for numbness. Negative for weakness.   Psychiatric/Behavioral:  The patient is nervous/anxious.        Vitals:    12/07/23 0942   BP: 122/82   Pulse: 74   Resp: 16   SpO2: 97%   PainSc:   4       Urine Drug Screen: Pending  Appropriate: Pending    Objective   Physical Exam  Vitals and nursing note reviewed.   Constitutional:       General: She is not in acute distress.     Appearance: Normal appearance. She is well-developed and normal weight.   Neck:      Trachea: No tracheal deviation.   Musculoskeletal:      Cervical back: Normal range of motion and neck supple. No tenderness.      Comments: Lumbar Spine Exam:  Tender to palpation over the lumbar paraspinal musculature Yes  Limited  range of motion secondary to pain Yes  Facet loading positive: Equivocal  Facets tender to palpation: Equivocal  Straight leg raise test positive: right     Neurological:      General: No focal deficit present.      Mental Status: She is alert.      Sensory: No sensory deficit.      Motor: No weakness.   Psychiatric:      Comments: Profound anxiety, very tearful           Assessment & Plan   Problems Addressed this Visit    None  Visit Diagnoses       Lumbar radiculopathy    -  Primary    Relevant Medications    diazePAM (Valium) 10 MG tablet    Other Relevant Orders    Epidural Block    Anxiety        Intervertebral disc stenosis of neural canal of lumbar region        Relevant Medications    diazePAM (Valium) 10 MG tablet    Other Relevant Orders    Epidural Block          Diagnoses         Codes Comments    Lumbar radiculopathy    -  Primary ICD-10-CM: M54.16  ICD-9-CM: 724.4     Anxiety     ICD-10-CM: F41.9  ICD-9-CM: 300.00     Intervertebral disc stenosis of neural canal of lumbar region     ICD-10-CM: M99.53  ICD-9-CM: 724.02             Plan:  She has a fairly significant lumbar radiculopathy consistent with the MRI showing acute disc herniation at L3-4.  This is likely due to lifting the object at work  She has thus far not gotten significant benefit from conservative treatment.  We did discuss a lumbar epidural steroid injection.  She became profoundly tearful, anxious, and extremely nervous about the procedure.  I feel that her uncontrolled anxiety is one of the contributing factors to the reason why she has not made more significant progress.  She reports that she is taking some form of medication from primary care provider however she began crying with even the discussion of injectable steroids  Will plan to do a lumbar epidural steroid injection at L3-4 with 10 mg diazepam for sedation.  She understands she will need somebody to drive her  Can also give a short course of gabapentin and refill  meloxicam  --- Follow-up next available for MARILUZ at L3-4 with oral sedation           INSPECT REPORT    As part of the patient's treatment plan, I may be prescribing controlled substances. The patient has been made aware of appropriate use of such medications, including potential risk of somnolence, limited ability to drive and/or work safely, and the potential for dependence or overdose. It has also bee made clear that these medications are for use by this patient only, without concomitant use of alcohol or other substances unless prescribed.     Patient has completed prescribing agreement detailing terms of continued prescribing of controlled substances, including monitoring RONNIE reports, urine drug screening, and pill counts if necessary. The patient is aware that inappropriate use will results in cessation of prescribing such medications.    INSPECT report has been reviewed and scanned into the patient's chart.    As the clinician, I personally reviewed the INSPECT from 12/5/2023.    History and physical exam exhibit continued safe and appropriate use of controlled substances.      EMR Dragon/Transcription disclaimer:   Much of this encounter note is an electronic transcription/translation of spoken language to printed text. The electronic translation of spoken language may permit erroneous, or at times, nonsensical words or phrases to be inadvertently transcribed; Although I have reviewed the note for such errors, some may still exist.

## 2023-12-08 ENCOUNTER — TELEPHONE (OUTPATIENT)
Dept: FAMILY MEDICINE CLINIC | Facility: CLINIC | Age: 58
End: 2023-12-08
Payer: COMMERCIAL

## 2023-12-08 NOTE — TELEPHONE ENCOUNTER
Caller: Amy Pyle    Relationship: Self    Best call back number: [phone]        PATIENT NEEDS TO KNOW IF HER FMLA PAPERWORK HAS BEEN RECEIVED BY OUR OFFICE.    SHE HAS HAD ZERO UPDATE ON THIS.     IF WE HAVE ALREADY COMPLETED AND SENT THIS OFF, OR ARE ABOUT TO, PLEASE GIVE PATIENT A CALLBACK REGARDLESS SO SHE'S AWARE OF WHAT'S GOING ON. PLEASE LEAVE A VOICEMAIL ON HER PHONE IF NO ANSWER.    PLEASE ADVISE

## 2023-12-08 NOTE — TELEPHONE ENCOUNTER
Left message per patient's request that we have not gotten this paperwork yet. Advised that she have them refax with attn to Anna Marie and to call the office with any questions.

## 2023-12-12 ENCOUNTER — TELEPHONE (OUTPATIENT)
Dept: PAIN MEDICINE | Facility: CLINIC | Age: 58
End: 2023-12-12
Payer: COMMERCIAL

## 2023-12-12 NOTE — TELEPHONE ENCOUNTER
Caller: PATIENT    Relationship to patient: SELF    Best call back number: [phone]    Patient is needing: PATIENT WAS CALLING TO DISCUSS AN ISSUE WITH HER GETTING HER PRESCRIPTION FOR VALIUM FOR TOMORROW'S PROCEDURE. PATIENT IS AT THE PHARMACY TO  THE VALIUM AND WAS TOLD THE PHARMACY NEEDS TO TALK TO THE OFFICE TO MAKE SURE IT'S OKAY FOR HER TO TAKE DUE TO DRUG INTERACTIONS. I ATTEMPTED TO WARM TRANSFER CALL TO THE OFFICE DUE TO PATIENT NOT BEING SURE WHAT SHE NEEDS TO DO BECAUSE HER PROCEDURE IS AT 8:00 AM TOMORROW. PATIENT WOULD LIKE A CALL BACK ASAP TO DISCUSS THIS ISSUE AND FIND OUT WHAT SHE NEEDS TO DO AND HAVE YOUR OFFICE TALK TO THE PHARMACY SO THEY CAN GET AN OKAY ON HER MEDICATION. THANK YOU!

## 2023-12-13 ENCOUNTER — HOSPITAL ENCOUNTER (OUTPATIENT)
Dept: PAIN MEDICINE | Facility: HOSPITAL | Age: 58
Discharge: HOME | End: 2023-12-13
Payer: COMMERCIAL

## 2023-12-13 VITALS
TEMPERATURE: 97.3 F | DIASTOLIC BLOOD PRESSURE: 77 MMHG | OXYGEN SATURATION: 94 % | RESPIRATION RATE: 16 BRPM | WEIGHT: 191 LBS | HEIGHT: 67 IN | BODY MASS INDEX: 29.98 KG/M2 | HEART RATE: 74 BPM | SYSTOLIC BLOOD PRESSURE: 125 MMHG

## 2023-12-13 DIAGNOSIS — M99.53 INTERVERTEBRAL DISC STENOSIS OF NEURAL CANAL OF LUMBAR REGION: ICD-10-CM

## 2023-12-13 DIAGNOSIS — R52 PAIN: ICD-10-CM

## 2023-12-13 DIAGNOSIS — M54.16 LUMBAR RADICULOPATHY: Primary | ICD-10-CM

## 2023-12-13 PROCEDURE — 25510000001 IOPAMIDOL 41 % SOLUTION: Performed by: STUDENT IN AN ORGANIZED HEALTH CARE EDUCATION/TRAINING PROGRAM

## 2023-12-13 PROCEDURE — 25010000002 METHYLPREDNISOLONE PER 40 MG: Performed by: STUDENT IN AN ORGANIZED HEALTH CARE EDUCATION/TRAINING PROGRAM

## 2023-12-13 PROCEDURE — 77003 FLUOROGUIDE FOR SPINE INJECT: CPT

## 2023-12-13 PROCEDURE — 25010000002 BUPIVACAINE (PF) 0.25 % SOLUTION: Performed by: STUDENT IN AN ORGANIZED HEALTH CARE EDUCATION/TRAINING PROGRAM

## 2023-12-13 RX ORDER — IOPAMIDOL 408 MG/ML
3 INJECTION, SOLUTION INTRATHECAL
Status: COMPLETED | OUTPATIENT
Start: 2023-12-13 | End: 2023-12-13

## 2023-12-13 RX ORDER — BUPIVACAINE HYDROCHLORIDE 2.5 MG/ML
10 INJECTION, SOLUTION EPIDURAL; INFILTRATION; INTRACAUDAL ONCE
Status: COMPLETED | OUTPATIENT
Start: 2023-12-13 | End: 2023-12-13

## 2023-12-13 RX ORDER — METHYLPREDNISOLONE ACETATE 40 MG/ML
40 INJECTION, SUSPENSION INTRA-ARTICULAR; INTRALESIONAL; INTRAMUSCULAR; SOFT TISSUE ONCE
Status: COMPLETED | OUTPATIENT
Start: 2023-12-13 | End: 2023-12-13

## 2023-12-13 RX ADMIN — BUPIVACAINE HYDROCHLORIDE 3 ML: 2.5 INJECTION, SOLUTION EPIDURAL; INFILTRATION; INTRACAUDAL; PERINEURAL at 08:28

## 2023-12-13 RX ADMIN — IOPAMIDOL 3 ML: 408 INJECTION, SOLUTION INTRATHECAL at 08:28

## 2023-12-13 RX ADMIN — METHYLPREDNISOLONE ACETATE 40 MG: 40 INJECTION, SUSPENSION INTRA-ARTICULAR; INTRALESIONAL; INTRAMUSCULAR; INTRASYNOVIAL; SOFT TISSUE at 08:28

## 2023-12-13 NOTE — DISCHARGE INSTRUCTIONS

## 2023-12-13 NOTE — TELEPHONE ENCOUNTER
We do not have clinical staff in the office on Tuesdays.  Pt is at her appt now in [place] and staff has been  contacted and they will address this.

## 2023-12-13 NOTE — PROCEDURES
Lumbar Epidural Steroid Injection  [facility]    PREOPERATIVE DIAGNOSIS:   Chronic low back pain, Lumbar Spinal Stenosis WITH Neurogenic Claudication, and Lumbar Radiculopathy  POSTOPERATIVE DIAGNOSIS:  Same as preop diagnosis    PROCEDURE:   Lumbar Epidural Steroid Injection, Therapeutic Translaminar Injection, with epidurogram, at  L3/L4 level    PRE-PROCEDURE DISCUSSION WITH PATIENT:    Risks and complications were discussed with the patient prior to starting the procedure and informed consent was obtained.  We discussed various topics including but not limited to bleeding, infection, injury, paralysis, nerve injury, dural puncture, coma, death, worsening of clinical picture, lack of pain relief, and postprocedural soreness.    SURGEON:  Bhavin Leon MD    REASON FOR PROCEDURE:    Diagnostic injection at this level is needed, Degenerative changes are noted in the area., Stenotic area is noted, and is likely contributing to this chronic &/or recurrent pain. , and Radicular pain pattern seems consistent with this dermatome.    SEDATION:  Patient declined administration of moderate sedation    ANESTHETIC:  Marcaine 0.25%  STEROID:   Methylprednisolone (DEPO MEDROL) 40mg/ml    DESCRIPTON OF PROCEDURE:    After obtaining informed consent, I.V. was not started in the preop area.   The patient was taken to the operating room and placed in the prone position.  All pressure points were well padded.  The lumbar spine area was prepped with Chloraprep and draped in a sterile fashion.  Under fluoroscopic guidance, the above mentioned interlaminar space was identified. Skin and subcutaneous tissues were anesthetized with 1% lidocaine in the middle of the space. A Tuohy needle was introduced through the skin and advanced to this interlaminar space and into the epidural space under fluoroscopic guidance and verified with loss-of-resistance technique to air.  After confirming the position of the needle with the  fluoroscope with all the views, and after aspiration was confirmed negative for blood and CSF, 1.5 mL of Omnipaque was injected.  After seeing appropriate epidurogram with lateral and PA views, a total of 4 cc solution was injected, consisting of 3cc of local anesthetic as above, with normal saline and injectable steroid as above.     ESTIMATED BLOOD LOSS:  <5 mL  SPECIMENS:  None    COMPLICATIONS:     No complications were noted., There was no indication of vascular uptake on live injection of contrast dye., There was no indication of intrathecal uptake on live injection of contrast dye., and There was not any evidence of dural puncture.      TOLERANCE & DISCHARGE CONDITION:    The patient tolerated the procedure well.  The patient was transported to the recovery area without difficulties.  The patient was discharged to home under the care of family in stable and satisfactory condition.    PLAN OF CARE:  The patient was given our standard instruction sheet.  The patient will Return to clinic 4-6 wks  The patient will resume all medications as per the medication reconciliation sheet.

## 2023-12-14 ENCOUNTER — TELEPHONE (OUTPATIENT)
Dept: PAIN MEDICINE | Facility: HOSPITAL | Age: 58
End: 2023-12-14
Payer: COMMERCIAL

## 2023-12-20 ENCOUNTER — TELEPHONE (OUTPATIENT)
Dept: PAIN MEDICINE | Facility: CLINIC | Age: 58
End: 2023-12-20

## 2023-12-20 RX ORDER — HYDROXYZINE HYDROCHLORIDE 25 MG/1
25 TABLET, FILM COATED ORAL 3 TIMES DAILY PRN
Qty: 90 TABLET | Refills: 0 | Status: SHIPPED | OUTPATIENT
Start: 2023-12-20

## 2023-12-20 NOTE — TELEPHONE ENCOUNTER
Patient called stating that her pain is back and is hurting pretty bad.  I explained to her the sometimes these shots don't help and that she may need a different type of shot.  I told her she could alternate heat and ice and that she would need to schedule a follow up appt to discuss the next steps.  Patient is calling [place] to set up appt.                  Clotilde Huebr RN

## 2023-12-21 ENCOUNTER — OFFICE VISIT (OUTPATIENT)
Dept: FAMILY MEDICINE CLINIC | Facility: CLINIC | Age: 58
End: 2023-12-21
Payer: COMMERCIAL

## 2023-12-21 VITALS
OXYGEN SATURATION: 96 % | HEART RATE: 90 BPM | SYSTOLIC BLOOD PRESSURE: 132 MMHG | DIASTOLIC BLOOD PRESSURE: 88 MMHG | HEIGHT: 67 IN | WEIGHT: 190 LBS | BODY MASS INDEX: 29.82 KG/M2

## 2023-12-21 DIAGNOSIS — M54.16 LUMBAR RADICULOPATHY: Primary | ICD-10-CM

## 2023-12-21 DIAGNOSIS — F33.1 MAJOR DEPRESSIVE DISORDER, RECURRENT, MODERATE: ICD-10-CM

## 2023-12-21 DIAGNOSIS — N28.89 RENAL MASS: ICD-10-CM

## 2023-12-21 PROCEDURE — 99214 OFFICE O/P EST MOD 30 MIN: CPT | Performed by: NURSE PRACTITIONER

## 2023-12-21 NOTE — ASSESSMENT & PLAN NOTE
Increase Zoloft to 50 mg daily  Add hydroxyzine 25 mg 3 times daily, may use as needed -recommended she take at night in case of drowsiness  Completing intermittent FMLA

## 2023-12-21 NOTE — PROGRESS NOTES
"Chief Complaint  Follow-up (6 week follow up depression/COPD/liver lesion/has surgery on the 22nd to have partial kidney removal possible complete )    Subjective        Amy Pyle presents to [facility]  History of Present Illness    Patient injured at work on 9/13 and has been seeing [facility] occupational medicine. Patient completed physical therapy without relief. She is also using a TENS unit.  MRI of L-spine without contrast was completed on 11/6.  The findings are as follows: 1.  Lumbar spondylosis with multilevel neural foraminal stenosis and mild spinal canal stenosis at L3-L4. 2.  Complex/septated cystic lesion is seen anteriorly within the left renal midpole.  Numerous tiny T2 hyperintense liver lesions, possibly cysts.  Further evaluation with three-phase CT of the abdomen may be beneficial. Patent has been referred to Pain Management - received first injection last week. CT scan abdomen showed cysts on the liver, unchanged from 2011. There is an area of concern on her kidney and I her to Urology for further evaluation. Patient is scheduled to have partial nephrectomy on 1/22.      Patient has hx major depression. She has tried and failed Lexapro.  Patient started on zoloft 25 mg daily - taking as prescribed. She denies adverse effects - crying all of the time.     Objective   Vital Signs:  /88 (BP Location: Left arm, Patient Position: Sitting, Cuff Size: Adult)   Pulse 90   Ht 170.2 cm (67\")   Wt 86.2 kg (190 lb)   SpO2 96%   BMI 29.76 kg/m²   Estimated body mass index is 29.76 kg/m² as calculated from the following:    Height as of this encounter: 170.2 cm (67\").    Weight as of this encounter: 86.2 kg (190 lb).           Physical Exam  Constitutional:       Appearance: Normal appearance.   HENT:      Head: Normocephalic.   Cardiovascular:      Rate and Rhythm: Normal rate and regular rhythm.   Pulmonary:      Effort: Pulmonary effort is normal.      " Breath sounds: Normal breath sounds.   Abdominal:      General: Abdomen is flat. Bowel sounds are normal.      Palpations: Abdomen is soft.   Musculoskeletal:         General: Normal range of motion.      Cervical back: Neck supple.      Right lower leg: No edema.      Left lower leg: No edema.   Skin:     General: Skin is warm and dry.   Neurological:      Mental Status: She is alert and oriented to person, place, and time.      Gait: Gait is intact.   Psychiatric:         Attention and Perception: Attention normal.         Mood and Affect: Mood normal. Affect is tearful.         Speech: Speech normal.        Result Review :      Data reviewed : Consultant notes Urology             Assessment and Plan   Diagnoses and all orders for this visit:    1. Lumbar radiculopathy (Primary)  Assessment & Plan:  May continue Flexeril as needed for muscle pain or spasm  Follow-up with pain management      2. Major depressive disorder, recurrent, moderate  Assessment & Plan:  Increase Zoloft to 50 mg daily  Add hydroxyzine 25 mg 3 times daily, may use as needed -recommended she take at night in case of drowsiness  Completing intermittent FMLA      3. Renal mass  Assessment & Plan:  Reviewed urology note, plans for partial nephrectomy      Other orders  -     sertraline (Zoloft) 50 MG tablet; Take 1 tablet by mouth Daily.  Dispense: 30 tablet; Refill: 2           I spent 30 minutes caring for Amy on this date of service. This time includes time spent by me in the following activities:preparing for the visit, reviewing tests, obtaining and/or reviewing a separately obtained history, performing a medically appropriate examination and/or evaluation , counseling and educating the patient/family/caregiver, ordering medications, tests, or procedures, documenting information in the medical record, and care coordination  Follow Up   Return in about 8 weeks (around 2/15/2024) for Anxiety/Depression.  Patient was given instructions and  counseling regarding her condition or for health maintenance advice. Please see specific information pulled into the AVS if appropriate.

## 2023-12-27 ENCOUNTER — TELEPHONE (OUTPATIENT)
Dept: FAMILY MEDICINE CLINIC | Facility: CLINIC | Age: 58
End: 2023-12-27

## 2023-12-27 NOTE — TELEPHONE ENCOUNTER
Caller: Amy Pyle    Relationship: Self    Best call back number: [phone]    What was the call regarding: PATIENT STATES THAT HER FMLA FORM WAS NOT FILLED OUT CORRECTLY. PATIENT STATES THAT QUESTIONS SEVEN AND EIGHT WERE NOT FILLED OUT CORRECTLY. PATIENT STATES THAT THE LETTER FROM [employer] THAT WAS SENT ALSO STATED THAT THERE COULD BE NO CHECKS OR X'S ON THE FORM. PATIENT STATES THAT SHE SENT A SCREENSHOT OF THIS TO JAE SEVERINO, BUT IT WOULD NOT LET HER. PLEASE REFILL THE FORM AND SEND IT ASAP.

## 2024-01-02 ENCOUNTER — TELEPHONE (OUTPATIENT)
Dept: PAIN MEDICINE | Facility: CLINIC | Age: 59
End: 2024-01-02
Payer: COMMERCIAL

## 2024-01-02 NOTE — TELEPHONE ENCOUNTER
Provider: NANNETTE    Caller: PATIENT    Phone Number: [phone]    Reason for Call: PATIENT STATES THAT THE EPIDURAL SHE HAD ON 12/13/23 WAS  BILLED TO HER HEALTH INSURANCE BUT IT SHOULD HAVE BEEN SUBMITTED TO HER WORK COMP CLAIM. SHE STATES HER INSURANCE DID PAY ON THE CLAIM, AND THEY WILL NEED TO BE REIMBURSED. PLEASE CALL HER TO DISCUSS.    NIDIA [address] [address], [facility] [id number] [email or web address]

## 2024-01-03 NOTE — TELEPHONE ENCOUNTER
CALLED PT- LEFT VM. LEFT BILLING'S NUMVER FOR HER TO CALL AND HAVE THEM ASSIST HER WITH THIS ISSUE.

## 2024-02-14 ENCOUNTER — OFFICE VISIT (OUTPATIENT)
Dept: FAMILY MEDICINE CLINIC | Facility: CLINIC | Age: 59
End: 2024-02-14
Payer: COMMERCIAL

## 2024-02-14 VITALS
DIASTOLIC BLOOD PRESSURE: 78 MMHG | BODY MASS INDEX: 30.61 KG/M2 | WEIGHT: 195 LBS | OXYGEN SATURATION: 98 % | HEIGHT: 67 IN | SYSTOLIC BLOOD PRESSURE: 124 MMHG | HEART RATE: 85 BPM

## 2024-02-14 DIAGNOSIS — F33.1 MODERATE EPISODE OF RECURRENT MAJOR DEPRESSIVE DISORDER: Primary | ICD-10-CM

## 2024-02-14 DIAGNOSIS — Z90.5 S/P NEPHRECTOMY: ICD-10-CM

## 2024-02-14 DIAGNOSIS — F51.01 PRIMARY INSOMNIA: ICD-10-CM

## 2024-02-14 PROCEDURE — 99214 OFFICE O/P EST MOD 30 MIN: CPT | Performed by: NURSE PRACTITIONER

## 2024-02-14 RX ORDER — CEPHALEXIN 500 MG/1
500 CAPSULE ORAL 2 TIMES DAILY
Qty: 14 CAPSULE | Refills: 0 | Status: SHIPPED | OUTPATIENT
Start: 2024-02-14

## 2024-02-14 RX ORDER — TRAZODONE HYDROCHLORIDE 50 MG/1
50 TABLET ORAL NIGHTLY
Qty: 30 TABLET | Refills: 1 | Status: SHIPPED | OUTPATIENT
Start: 2024-02-14

## 2024-04-01 ENCOUNTER — TELEPHONE (OUTPATIENT)
Dept: FAMILY MEDICINE CLINIC | Facility: CLINIC | Age: 59
End: 2024-04-01
Payer: COMMERCIAL

## 2024-04-01 NOTE — TELEPHONE ENCOUNTER
Attempted to call pt needing to reschedule 5/22 appt, no answer: per verbal left msg for pt to call office

## 2024-04-11 ENCOUNTER — TELEPHONE (OUTPATIENT)
Dept: PAIN MEDICINE | Facility: CLINIC | Age: 59
End: 2024-04-11
Payer: COMMERCIAL

## 2024-04-11 NOTE — TELEPHONE ENCOUNTER
Caller: ZECHARIAH    Relationship to patient: Other    Best call back number: [phone]         Patient is needing: BISA WITH [employer] CALLED TO SEE IF THE NURSE  WOULD BE ALLOWED TO COME TO AN OFFICE VISIT WITH THE PATIENT. PATIENTS NEXT VISIT IS 4/15/24.

## 2024-04-15 ENCOUNTER — TELEPHONE (OUTPATIENT)
Dept: PAIN MEDICINE | Facility: CLINIC | Age: 59
End: 2024-04-15

## 2024-04-15 ENCOUNTER — OFFICE VISIT (OUTPATIENT)
Dept: PAIN MEDICINE | Facility: CLINIC | Age: 59
End: 2024-04-15
Payer: COMMERCIAL

## 2024-04-15 VITALS
DIASTOLIC BLOOD PRESSURE: 77 MMHG | WEIGHT: 200.4 LBS | BODY MASS INDEX: 31.39 KG/M2 | SYSTOLIC BLOOD PRESSURE: 138 MMHG | HEART RATE: 77 BPM | OXYGEN SATURATION: 97 % | RESPIRATION RATE: 16 BRPM

## 2024-04-15 DIAGNOSIS — M79.18 MYOFASCIAL PAIN: ICD-10-CM

## 2024-04-15 DIAGNOSIS — M62.838 MUSCLE SPASM: Primary | ICD-10-CM

## 2024-04-15 PROCEDURE — 99214 OFFICE O/P EST MOD 30 MIN: CPT | Performed by: STUDENT IN AN ORGANIZED HEALTH CARE EDUCATION/TRAINING PROGRAM

## 2024-04-15 RX ORDER — TIZANIDINE 2 MG/1
2 TABLET ORAL EVERY 8 HOURS PRN
Qty: 90 TABLET | Refills: 0 | Status: SHIPPED | OUTPATIENT
Start: 2024-04-15

## 2024-04-15 NOTE — TELEPHONE ENCOUNTER
Caller: Amy Pyle    Relationship: Self    Best call back number:     What form or medical record are you requesting: NOTE TO RETURN BACK TO WORK NEEDED    Who is requesting this form or medical record from you: EMPLOYER    How would you like to receive the form or medical records (pick-up, mail, fax): EMAIL IF WE CAN TO [email or web address]       Timeframe paperwork needed: ASAP    Additional notes: IF WE CAN PLEASE EMAIL HER WORK RETURN NOTE IF NOT PLEASE UPLOAD TO [employer]

## 2024-04-16 ENCOUNTER — TELEPHONE (OUTPATIENT)
Dept: PAIN MEDICINE | Facility: CLINIC | Age: 59
End: 2024-04-16
Payer: COMMERCIAL

## 2024-04-16 NOTE — TELEPHONE ENCOUNTER
Caller: LUCIA     Relationship: [employer]     Best call back number: [phone]    What form or medical record are you requesting: OFFICE NOTES 04/15/2024, WORK STATUS     Who is requesting this form or medical record from you: WORK COMP     How would you like to receive the form or medical records (pick-up, mail, fax): FAX  If fax, what is the fax number: [phone]  ATTN FELISA   Timeframe paperwork needed: ASAP    Additional notes: N/A

## 2024-04-26 ENCOUNTER — TELEPHONE (OUTPATIENT)
Dept: PAIN MEDICINE | Facility: CLINIC | Age: 59
End: 2024-04-26
Payer: COMMERCIAL

## 2024-04-26 NOTE — TELEPHONE ENCOUNTER
Caller: YOUSUF    Relationship:  ADJ    Best call back number: [phone]    What form or medical record are you requesting: OFFICE NOTE, WORK STATUS, ANY ORDERS FROM APPT 04/15/24    Who is requesting this form or medical record from you:     How would you like to receive the form or medical records (pick-up, mail, fax): FAX  If fax, what is the fax number: [phone]      Timeframe paperwork needed: ASAP    Additional notes: WC CONTACT IS AMAN (PHONE: [phone]).

## 2024-05-16 ENCOUNTER — TELEPHONE (OUTPATIENT)
Dept: PAIN MEDICINE | Facility: CLINIC | Age: 59
End: 2024-05-16
Payer: COMMERCIAL

## 2024-05-16 NOTE — TELEPHONE ENCOUNTER
Provider: DR BRUNSON     Caller: PATIENT     Pharmacy: [facility]     Phone Number: [phone]    Reason for Call: PATIENT IS HAVING SEVERE PAIN IN HER BACK WHICH IS CAUSING NUMBNESS AND PAIN IN HER RIGHT LEG. SHE HAS TO USE HER PANT LEG TO  HER LEG AND CANNOT ROLL OVER ON HER RIGHT SIDE WHEN LAYING DOWN. PRETTY MUCH ANY MOVEMENT IS CAUSING PAIN. NO SOONER AVAILABILITY FOR APPT. PLEASE ADVISE IF THERE IS ANYTHING SHE CAN DO TO BE MORE COMFORTABLE OR IF YOU CAN BE SEEN SOONER.     When was the patient last seen: 4-15-24

## 2024-05-17 ENCOUNTER — TELEPHONE (OUTPATIENT)
Dept: FAMILY MEDICINE CLINIC | Facility: CLINIC | Age: 59
End: 2024-05-17
Payer: COMMERCIAL

## 2024-05-17 NOTE — TELEPHONE ENCOUNTER
Pt was supposed to have a follow up on 5/22/24, but had to be rescheduled d/t schedule conflict. She filed for short term disability with her employer until July 31, 2024. She has missed the last 2 days of work because she is really struggling and she is not sure what to do moving forward. She is really hoping to get in with you asap to discuss this and hopefully figure out what is needed to prevent her from losing her job. She is asking about getting in sooner than September. Please advise.

## 2024-05-17 NOTE — TELEPHONE ENCOUNTER
We can watch out for the paper but she also needs to get on pain management schedule as they have been managing her back pain

## 2024-05-17 NOTE — TELEPHONE ENCOUNTER
She is scheduled with them and has a phone call into them as well. She said something about getting a referral to a back specialist as well and was told by the work comp provider that she would have to get this from her PCP so she plans to discuss this at her appointment as well.

## 2024-05-17 NOTE — TELEPHONE ENCOUNTER
Pt scheduled for that day. She wanted me to let you know they will be faxing paperwork for her STD because she cannot move at all. She stated that she can't even lift her legs or anything. She said her pain is so severe. She tried working on Wed and by the time she left work she could not walk. She said she had a work injury back in September 2023 and workman's comp is no longer covering the case because they said this issue is something different, but she said that it is all related and seems to be getting worse. She said she has not been back to work since Wed and wanted to make sure you were aware for the paper work to be dated for Thursday's date (5/16/24). She will plan on the appt for 5/28 and just wanted to be sure we are aware the paperwork is being faxed.

## 2024-05-20 DIAGNOSIS — M54.16 LUMBAR RADICULOPATHY: Primary | ICD-10-CM

## 2024-05-28 ENCOUNTER — OFFICE VISIT (OUTPATIENT)
Dept: FAMILY MEDICINE CLINIC | Facility: CLINIC | Age: 59
End: 2024-05-28
Payer: COMMERCIAL

## 2024-05-28 VITALS
WEIGHT: 196 LBS | SYSTOLIC BLOOD PRESSURE: 132 MMHG | OXYGEN SATURATION: 97 % | HEART RATE: 84 BPM | DIASTOLIC BLOOD PRESSURE: 88 MMHG | BODY MASS INDEX: 30.76 KG/M2 | HEIGHT: 67 IN

## 2024-05-28 DIAGNOSIS — R29.898 WEAKNESS OF RIGHT LOWER EXTREMITY: ICD-10-CM

## 2024-05-28 DIAGNOSIS — M54.16 LUMBAR RADICULOPATHY: Primary | ICD-10-CM

## 2024-05-28 DIAGNOSIS — Z90.5 S/P NEPHRECTOMY: ICD-10-CM

## 2024-05-28 PROCEDURE — 99214 OFFICE O/P EST MOD 30 MIN: CPT | Performed by: NURSE PRACTITIONER

## 2024-05-28 PROCEDURE — 96372 THER/PROPH/DIAG INJ SC/IM: CPT | Performed by: NURSE PRACTITIONER

## 2024-05-28 RX ORDER — KETOROLAC TROMETHAMINE 30 MG/ML
30 INJECTION, SOLUTION INTRAMUSCULAR; INTRAVENOUS ONCE
Status: COMPLETED | OUTPATIENT
Start: 2024-05-28 | End: 2024-05-28

## 2024-05-28 RX ORDER — TRAMADOL HYDROCHLORIDE 50 MG/1
50 TABLET ORAL EVERY 8 HOURS PRN
Qty: 30 TABLET | Refills: 0 | Status: SHIPPED | OUTPATIENT
Start: 2024-05-28

## 2024-05-28 RX ORDER — NAPROXEN 250 MG/1
250 TABLET ORAL 2 TIMES DAILY PRN
Qty: 60 TABLET | Refills: 0 | Status: SHIPPED | OUTPATIENT
Start: 2024-05-28

## 2024-05-28 RX ORDER — PREDNISONE 10 MG/1
TABLET ORAL
Qty: 33 TABLET | Refills: 0 | Status: SHIPPED | OUTPATIENT
Start: 2024-05-28

## 2024-05-28 RX ADMIN — KETOROLAC TROMETHAMINE 30 MG: 30 INJECTION, SOLUTION INTRAMUSCULAR; INTRAVENOUS at 10:13

## 2024-05-28 NOTE — PROGRESS NOTES
"Chief Complaint  Follow-up (Severe back pain/discuss STD/FMLA )    Subjective        Amy Pyle presents to [facility]  History of Present Illness    Patient presents with concerns of worsening back pain. She was injured at work in September 2023. Patient referred to Pain Management and Workman's Comp physician. She underwent MRI spine, treated with NSAIDS and epidural injections. Note from 12/21/23: Patient injured at work on 9/13 and has been seeing [facility] occupational medicine. Patient completed physical therapy without relief. She is also using a TENS unit.  MRI of L-spine without contrast was completed on 11/6.  The findings are as follows: 1.  Lumbar spondylosis with multilevel neural foraminal stenosis and mild spinal canal stenosis at L3-L4. 2.  Complex/septated cystic lesion is seen anteriorly within the left renal midpole. Patient was reportedly cleared to return to work (specific to back pain) on 4/15/24 per Dr. Leon after one epidural. Patient returned to work, has had progressing pain since doing so. She is also having weakness to right leg. Patient will see Dr. Leon on 6/10. I have also referred her to spine surgery to reevaluate. Patient is tearful, employer advised her it was r/t \"previous issues.\" She is unable to work at this time, has submitted leave of absence from 5/15-7/31.     Patient also underwent partial nephrectomy r/t CA. She is followed by Dr. Allen - has been returned to work in that regard.     Objective   Vital Signs:  /88 (BP Location: Left arm, Patient Position: Sitting, Cuff Size: Adult)   Pulse 84   Ht 170.2 cm (67\")   Wt 88.9 kg (196 lb)   SpO2 97%   BMI 30.70 kg/m²   Estimated body mass index is 30.7 kg/m² as calculated from the following:    Height as of this encounter: 170.2 cm (67\").    Weight as of this encounter: 88.9 kg (196 lb).           Physical Exam  Constitutional:       Appearance: Normal appearance. "   HENT:      Head: Normocephalic.   Cardiovascular:      Rate and Rhythm: Normal rate and regular rhythm.   Pulmonary:      Effort: Pulmonary effort is normal.      Breath sounds: Normal breath sounds.   Abdominal:      General: Abdomen is flat. Bowel sounds are normal.      Palpations: Abdomen is soft.   Musculoskeletal:         General: Normal range of motion.      Cervical back: Neck supple.      Lumbar back: Bony tenderness present. No tenderness. Decreased range of motion.        Back:       Right lower leg: No edema.      Left lower leg: No edema.   Skin:     General: Skin is warm and dry.   Neurological:      Mental Status: She is alert and oriented to person, place, and time.      Motor: Weakness present.      Gait: Gait abnormal.   Psychiatric:         Attention and Perception: Attention normal.         Mood and Affect: Mood normal.         Speech: Speech normal.        Result Review :                     Assessment and Plan     Diagnoses and all orders for this visit:    1. Lumbar radiculopathy (Primary)  -     MRI Lumbar Spine Without Contrast; Future  -     predniSONE (DELTASONE) 10 MG tablet; Take 5 po for 2 days, Take 4 for 2 days, then 3 for 2 days, then 2 for 2 days, then 1 for 5 days, then stop  Dispense: 33 tablet; Refill: 0  -     naproxen (NAPROSYN) 250 MG tablet; Take 1 tablet by mouth 2 (Two) Times a Day As Needed for Mild Pain.  Dispense: 60 tablet; Refill: 0  -     traMADol (Ultram) 50 MG tablet; Take 1 tablet by mouth Every 8 (Eight) Hours As Needed for Moderate Pain.  Dispense: 30 tablet; Refill: 0  -     ketorolac (TORADOL) injection 30 mg    2. S/p nephrectomy    3. Weakness of right lower extremity  -     MRI Lumbar Spine Without Contrast; Future    - MRI L-spine  - tapering Prednisone  - Naproxen BID PRN pain  - Tramadol PRN severe pain  - Off work approved through 7/31  - Keep f/u with Pain Management  - Referred to Neurosurgery       I spent 30 minutes caring for Amy on this date  of service. This time includes time spent by me in the following activities:preparing for the visit, reviewing tests, obtaining and/or reviewing a separately obtained history, performing a medically appropriate examination and/or evaluation , counseling and educating the patient/family/caregiver, ordering medications, tests, or procedures, documenting information in the medical record, and care coordination  Follow Up     Return in about 8 weeks (around 7/23/2024) for Low Back Pain.  Patient was given instructions and counseling regarding her condition or for health maintenance advice. Please see specific information pulled into the AVS if appropriate.

## 2024-05-28 NOTE — PROGRESS NOTES
Injection  Injection performed in the right ventrogluteal by Anna Marie Keith MA. Patient tolerated the procedure well without complications.  05/28/24   Anna Marie Keith MA

## 2024-06-10 ENCOUNTER — OFFICE VISIT (OUTPATIENT)
Dept: PAIN MEDICINE | Facility: CLINIC | Age: 59
End: 2024-06-10

## 2024-06-10 ENCOUNTER — TELEPHONE (OUTPATIENT)
Dept: PAIN MEDICINE | Facility: CLINIC | Age: 59
End: 2024-06-10

## 2024-06-10 VITALS
OXYGEN SATURATION: 95 % | WEIGHT: 198 LBS | DIASTOLIC BLOOD PRESSURE: 73 MMHG | BODY MASS INDEX: 31.01 KG/M2 | HEART RATE: 75 BPM | SYSTOLIC BLOOD PRESSURE: 101 MMHG

## 2024-06-10 DIAGNOSIS — M79.18 MYOFASCIAL PAIN: ICD-10-CM

## 2024-06-10 DIAGNOSIS — M99.53 INTERVERTEBRAL DISC STENOSIS OF NEURAL CANAL OF LUMBAR REGION: ICD-10-CM

## 2024-06-10 DIAGNOSIS — M54.16 LUMBAR RADICULOPATHY: ICD-10-CM

## 2024-06-10 DIAGNOSIS — M62.838 MUSCLE SPASM: Primary | ICD-10-CM

## 2024-06-10 PROCEDURE — 99214 OFFICE O/P EST MOD 30 MIN: CPT | Performed by: STUDENT IN AN ORGANIZED HEALTH CARE EDUCATION/TRAINING PROGRAM

## 2024-06-10 RX ORDER — TIZANIDINE 2 MG/1
2 TABLET ORAL EVERY 8 HOURS PRN
Qty: 90 TABLET | Refills: 0 | Status: SHIPPED | OUTPATIENT
Start: 2024-06-10

## 2024-06-10 RX ORDER — TRAMADOL HYDROCHLORIDE 50 MG/1
50 TABLET ORAL EVERY 8 HOURS PRN
Qty: 30 TABLET | Refills: 0 | Status: SHIPPED | OUTPATIENT
Start: 2024-06-10

## 2024-06-10 NOTE — TELEPHONE ENCOUNTER
Caller: GOLDIE     Relationship: [employer] - NURSE  ASSISTANT    Best call back number:     What form or medical record are you requesting: PROGRESS NOTES AND WORK STATUS FROM 6/10/24    Who is requesting this form or medical record from you: WORK COMP    How would you like to receive the form or medical records (pick-up, mail, fax): FAX  If fax, what is the fax number: [phone]    Timeframe paperwork needed: ASAP

## 2024-06-10 NOTE — PROGRESS NOTES
CHIEF COMPLAINT  Chief Complaint   Patient presents with    Back Pain     Tramadol LD  10:00pm 6-9-24       Primary Care  Mariaelena Granger APRN Subjective   Amy Pyle is a 58 y.o. female  who presents for chronic radicular low back pain.  She reports that several months ago, she was injured while she was working.  She reports lifting a heavy pallet of supplies and felt a popping sensation.  Since that time, she has had significant radicular back pain especially into the right leg including the anterior thigh and lateral calf.  She reports that while some of her pain has improved over time, she is left with fairly significant back pain which makes it difficult to walk and be physically active.  She denies any profound numbness or weakness.  She did undergo an MRI which shows a very significant L3-4 disc herniation which likely explains the majority of her pain.  She has tried conservative treatment including meloxicam and cyclobenzaprine with only limited benefit.  She has tried home exercise and physical therapy with limited benefit.    History of Present Illness     Location: Low back with radiation to the right lower extremity  Onset: Several months ago  Duration: Slightly improved  Timing: Constant throughout the day  Quality: Sharp, aching, stabbing  Severity: Today: 4       Last Week: 4       Worst: 8  Modifying Factors: The pain is worse with physical activity and movement and walking and slightly improved with sitting and resting  Functional Deficit: The pain makes it difficult for her to work her fairly active job    Physical Therapy: yes    Interval Update 06/10/2024: Complaining of show significant worse.  Reports that she had an episode several weeks ago where she was unable to walk, unable to ambulate, unable to leave the bed.  Unfortunately, she refused to seek medical treatment as she did not feel that she could afford it.  She ultimately was seen by her PCP who started her on chronic  narcotic therapy and the patient reports being slightly better.  Still pending new MRI in several weeks.  Otherwise, no better.  Reports that she is unable to work however she also does not want to apply for disability because it will not pay her enough money.    The following portions of the patient's history were reviewed and updated as appropriate: allergies, current medications, past family history, past medical history, past social history, past surgical history, and problem list.    Procedures:        Current Outpatient Medications:     albuterol sulfate  (90 Base) MCG/ACT inhaler, Inhale 2 puffs Every 4 (Four) Hours As Needed for Wheezing., Disp: 1 g, Rfl: 2    Budeson-Glycopyrrol-Formoterol (Breztri Aerosphere) 160-9-4.8 MCG/ACT aerosol inhaler, Inhale 2 puffs 2 (Two) Times a Day., Disp: 8 g, Rfl: 2    ipratropium-albuterol (DUO-NEB) 0.5-2.5 mg/3 ml nebulizer, USE 3 ML VIA NEBULIZER EVERY 4 HOURS AS NEEDED FOR WHEEZING OR SHORTNESS OF BREATH, Disp: 1620 mL, Rfl: 1    naproxen (NAPROSYN) 250 MG tablet, Take 1 tablet by mouth 2 (Two) Times a Day As Needed for Mild Pain., Disp: 60 tablet, Rfl: 0    predniSONE (DELTASONE) 10 MG tablet, Take 5 po for 2 days, Take 4 for 2 days, then 3 for 2 days, then 2 for 2 days, then 1 for 5 days, then stop, Disp: 33 tablet, Rfl: 0    sertraline (Zoloft) 50 MG tablet, Take 1 tablet by mouth Daily., Disp: 30 tablet, Rfl: 2    tiZANidine (ZANAFLEX) 2 MG tablet, Take 1 tablet by mouth Every 8 (Eight) Hours As Needed for Muscle Spasms., Disp: 90 tablet, Rfl: 0    traMADol (Ultram) 50 MG tablet, Take 1 tablet by mouth Every 8 (Eight) Hours As Needed for Moderate Pain., Disp: 30 tablet, Rfl: 0    Review of Systems   Musculoskeletal:  Positive for arthralgias, back pain, gait problem and myalgias. Negative for joint swelling, neck pain and neck stiffness.   Neurological:  Positive for numbness. Negative for weakness.   Psychiatric/Behavioral:  The patient is nervous/anxious.         Vitals:    06/10/24 0808   BP: 101/73   Pulse: 75   SpO2: 95%   Weight: 89.8 kg (198 lb)   PainSc:   5       Urine Drug Screen: Pending  Appropriate: Pending    Objective   Physical Exam  Vitals and nursing note reviewed.   Constitutional:       General: She is not in acute distress.     Appearance: Normal appearance. She is well-developed and normal weight.   Neck:      Trachea: No tracheal deviation.   Musculoskeletal:      Cervical back: Normal range of motion and neck supple. No tenderness.      Comments: Lumbar Spine Exam:  Tender to palpation over the lumbar paraspinal musculature Yes  Limited range of motion secondary to pain Yes  Facet loading positive: Equivocal  Facets tender to palpation: Equivocal  Straight leg raise test positive: right     Neurological:      General: No focal deficit present.      Mental Status: She is alert.      Sensory: No sensory deficit.      Motor: No weakness.   Psychiatric:      Comments: Profound anxiety, very tearful           Assessment & Plan   Problems Addressed this Visit       Muscle spasm - Primary    Lumbar radiculopathy    Relevant Medications    traMADol (Ultram) 50 MG tablet     Other Visit Diagnoses       Myofascial pain        Intervertebral disc stenosis of neural canal of lumbar region              Diagnoses         Codes Comments    Muscle spasm    -  Primary ICD-10-CM: M62.838  ICD-9-CM: 728.85     Lumbar radiculopathy     ICD-10-CM: M54.16  ICD-9-CM: 724.4     Myofascial pain     ICD-10-CM: M79.18  ICD-9-CM: 729.1     Intervertebral disc stenosis of neural canal of lumbar region     ICD-10-CM: M99.53  ICD-9-CM: 724.02             Plan:  Continue tramadol  Pending new MRI to see if there are any new problems  Overall, there is really not much else to do for her.  She has multiple complaints likely unrelated to her acute work-related back injury which are preventing her from working including multiple psychosocial issues  --- Follow-up 3 weeks            INSPECT REPORT    As part of the patient's treatment plan, I may be prescribing controlled substances. The patient has been made aware of appropriate use of such medications, including potential risk of somnolence, limited ability to drive and/or work safely, and the potential for dependence or overdose. It has also bee made clear that these medications are for use by this patient only, without concomitant use of alcohol or other substances unless prescribed.     Patient has completed prescribing agreement detailing terms of continued prescribing of controlled substances, including monitoring RONNIE reports, urine drug screening, and pill counts if necessary. The patient is aware that inappropriate use will results in cessation of prescribing such medications.    INSPECT report has been reviewed and scanned into the patient's chart.    As the clinician, I personally reviewed the INSPECT from 6/7/2024.    History and physical exam exhibit continued safe and appropriate use of controlled substances.      EMR Dragon/Transcription disclaimer:   Much of this encounter note is an electronic transcription/translation of spoken language to printed text. The electronic translation of spoken language may permit erroneous, or at times, nonsensical words or phrases to be inadvertently transcribed; Although I have reviewed the note for such errors, some may still exist.

## 2024-06-18 ENCOUNTER — HOSPITAL ENCOUNTER (OUTPATIENT)
Dept: MRI IMAGING | Facility: HOSPITAL | Age: 59
Discharge: HOME | End: 2024-06-18
Admitting: NURSE PRACTITIONER
Payer: COMMERCIAL

## 2024-06-18 DIAGNOSIS — R29.898 WEAKNESS OF RIGHT LOWER EXTREMITY: ICD-10-CM

## 2024-06-18 DIAGNOSIS — M54.16 LUMBAR RADICULOPATHY: ICD-10-CM

## 2024-06-18 PROCEDURE — 72148 MRI LUMBAR SPINE W/O DYE: CPT

## 2024-06-19 NOTE — PROGRESS NOTES
Please let patient know that her MRI shows multilevel degenerative changes without significant changes from the prior study.  I would recommend follow-up with pain management/spine surgery to discuss increasing pain

## 2024-06-21 ENCOUNTER — TELEPHONE (OUTPATIENT)
Dept: PAIN MEDICINE | Facility: CLINIC | Age: 59
End: 2024-06-21
Payer: COMMERCIAL

## 2024-06-21 NOTE — TELEPHONE ENCOUNTER
Caller: GOLDIE    Relationship: WALTER [employer]EELE    Best call back number: [phone]    What form or medical record are you requesting: OFFICE NOTES FOR 6/10/24    Who is requesting this form or medical record from you: [employer]- W[employer]     How would you like to receive the form or medical records (pick-up, mail, fax):    FAX# [phone]    Timeframe paperwork needed: ASAP      ”

## 2024-07-08 ENCOUNTER — TELEPHONE (OUTPATIENT)
Dept: PAIN MEDICINE | Facility: CLINIC | Age: 59
End: 2024-07-08
Payer: COMMERCIAL

## 2024-07-08 NOTE — TELEPHONE ENCOUNTER
Caller: Amy Pyle    Relationship: Self    Best call back number: [phone]    What form or medical record are you requesting: ALL OFFICE VISITS AND PROCEDURES WITH DR BRUNSON    Who is requesting this form or medical record from you: SELF    How would you like to receive the form or medical records (pick-up, mail, fax): MAIL  If mail, what is the address: [address] [place] [address] [place] [address]    Timeframe paperwork needed: ASAP    Additional notes: PT REQUESTS FOR OFFICE TO MESSAGE HER ON [employer] WHEN RECORDS ARE SENT.

## 2024-07-12 ENCOUNTER — TELEPHONE (OUTPATIENT)
Dept: FAMILY MEDICINE CLINIC | Facility: CLINIC | Age: 59
End: 2024-07-12

## 2024-07-12 ENCOUNTER — OFFICE VISIT (OUTPATIENT)
Dept: FAMILY MEDICINE CLINIC | Facility: CLINIC | Age: 59
End: 2024-07-12
Payer: COMMERCIAL

## 2024-07-12 VITALS
DIASTOLIC BLOOD PRESSURE: 88 MMHG | HEART RATE: 55 BPM | WEIGHT: 202 LBS | OXYGEN SATURATION: 95 % | SYSTOLIC BLOOD PRESSURE: 132 MMHG | HEIGHT: 67 IN | BODY MASS INDEX: 31.71 KG/M2

## 2024-07-12 DIAGNOSIS — M51.36 DDD (DEGENERATIVE DISC DISEASE), LUMBAR: ICD-10-CM

## 2024-07-12 DIAGNOSIS — Z12.31 SCREENING MAMMOGRAM FOR BREAST CANCER: ICD-10-CM

## 2024-07-12 DIAGNOSIS — M54.16 LUMBAR RADICULOPATHY: Primary | ICD-10-CM

## 2024-07-12 PROBLEM — M51.369 DDD (DEGENERATIVE DISC DISEASE), LUMBAR: Status: ACTIVE | Noted: 2024-07-12

## 2024-07-12 PROCEDURE — 99214 OFFICE O/P EST MOD 30 MIN: CPT | Performed by: NURSE PRACTITIONER

## 2024-07-12 RX ORDER — PREGABALIN 25 MG/1
25 CAPSULE ORAL 2 TIMES DAILY
Qty: 60 CAPSULE | Refills: 0 | Status: SHIPPED | OUTPATIENT
Start: 2024-07-12

## 2024-07-12 NOTE — TELEPHONE ENCOUNTER
Thank you for the update.  Let her know I did send in the Lyrica to try twice daily.  I recommend that she call every so often to pain management to see if there are any cancellations for sooner appointments.

## 2024-07-12 NOTE — PROGRESS NOTES
Chief Complaint  Follow-up (8 week follow up lower back pain )    Subjective        Amy Pyle presents to [facility]  History of Present Illness    Patient presents for follow-up visit. She was injured at work in September 2023. Patient referred to Pain Management and Workman's Comp physician. She underwent MRI spine, treated with NSAIDS and epidural injections. Note from 12/21/23: Patient injured at work on 9/13 and has been seeing [facility] occupational medicine. Patient completed physical therapy without relief. She is also using a TENS unit.  MRI of L-spine without contrast was completed on 11/6.  The findings are as follows: 1.  Lumbar spondylosis with multilevel neural foraminal stenosis and mild spinal canal stenosis at L3-L4. 2.  Complex/septated cystic lesion is seen anteriorly within the left renal midpole. Patient was reportedly cleared to return to work (specific to back pain) on 4/15/24 per Dr. Leon after one epidural. Patient returned to work, has had progressing pain since doing so. She is also having weakness to right leg. Patient seen again by Dr. Leon on 6/10 - advised concerns were unrelated to acute injury, unsure if anything more could be done. Patient referred her to spine surgery to reevaluate. She is unable to work at this time, has submitted leave of absence from 5/15-7/31.  Repeat MRI of L-spine showed multilevel degenerative changes as characterized above, not significantly changed since prior study when accounting for differences in technique and motion artifact noted on current study. Patient evaluated by Dr. Bradshaw with [facility] - ordered a second opinion from Pain Management and Flexeril PRN pain. She does not get relief from muscle relaxants. Patient reports she is unable to bend to shave her legs, cannot walk through grocery store due to pain to the right side of her low back and into RLE.     Objective   Vital  "Signs:  /88 (BP Location: Left arm, Patient Position: Sitting, Cuff Size: Adult)   Pulse 55   Ht 170.2 cm (67\")   Wt 91.6 kg (202 lb)   SpO2 95%   BMI 31.64 kg/m²   Estimated body mass index is 31.64 kg/m² as calculated from the following:    Height as of this encounter: 170.2 cm (67\").    Weight as of this encounter: 91.6 kg (202 lb).           Physical Exam  Constitutional:       Appearance: Normal appearance.   HENT:      Head: Normocephalic.   Cardiovascular:      Rate and Rhythm: Normal rate and regular rhythm.   Pulmonary:      Effort: Pulmonary effort is normal.      Breath sounds: Normal breath sounds.   Abdominal:      General: Abdomen is flat. Bowel sounds are normal.      Palpations: Abdomen is soft.   Musculoskeletal:         General: Normal range of motion.      Cervical back: Neck supple.      Right lower leg: No edema.      Left lower leg: No edema.   Skin:     General: Skin is warm and dry.   Neurological:      Mental Status: She is alert and oriented to person, place, and time.      Gait: Gait abnormal.   Psychiatric:         Attention and Perception: Attention normal.         Mood and Affect: Mood normal. Affect is tearful.         Speech: Speech normal.        Result Review :      CMP          11/14/2023    11:49   CMP   Glucose 89    BUN 10    Creatinine 0.67    EGFR 100.2    Sodium 137    Potassium 4.5    Chloride 102    Calcium 9.6    Total Protein 7.3    Albumin 4.7    Globulin 2.6    Total Bilirubin 0.4    Alkaline Phosphatase 41    AST (SGOT) 12    ALT (SGPT) 9    Albumin/Globulin Ratio 1.8    BUN/Creatinine Ratio 14.9    Anion Gap 10.1      CBC          11/14/2023    11:49   CBC   WBC 10.52    RBC 4.66    Hemoglobin 13.4    Hematocrit 39.2    MCV 84.1    MCH 28.8    MCHC 34.2    RDW 12.4    Platelets 293      Lipid Panel          11/14/2023    11:49   Lipid Panel   Total Cholesterol 255    Triglycerides 76    HDL Cholesterol 69    VLDL Cholesterol 13    LDL Cholesterol  173  "   LDL/HDL Ratio 2.48      TSH          11/14/2023    11:49   TSH   TSH 0.660      Data reviewed : Consultant notes [facility]             Assessment and Plan     Diagnoses and all orders for this visit:    1. Lumbar radiculopathy (Primary)  Assessment & Plan:  Reviewed PM note, updated MRI and [facility] note  Start Lyrica 25 mg BID  Recommend she schedule with Pain Management.  Workmen's Comp. is reportedly denying patient to see pain management.  I did advise that she can choose to see pain management but will have to be billed personal health insurance.  We will extend her leave of absence an additional 4 weeks but a plan of care must be made with pain management and [facility]    Orders:  -     pregabalin (Lyrica) 25 MG capsule; Take 1 capsule by mouth 2 (Two) Times a Day.  Dispense: 60 capsule; Refill: 0    2. DDD (degenerative disc disease), lumbar    3. Screening mammogram for breast cancer  -     Mammo Screening Digital Tomosynthesis Bilateral With CAD; Future           I spent 30 minutes caring for Amy on this date of service. This time includes time spent by me in the following activities:preparing for the visit, reviewing tests, obtaining and/or reviewing a separately obtained history, performing a medically appropriate examination and/or evaluation , counseling and educating the patient/family/caregiver, ordering medications, tests, or procedures, documenting information in the medical record, and care coordination  Follow Up     Return for  Next scheduled follow up.  Patient was given instructions and counseling regarding her condition or for health maintenance advice. Please see specific information pulled into the AVS if appropriate.

## 2024-07-12 NOTE — ASSESSMENT & PLAN NOTE
Reviewed PM note, updated MRI and [facility] note  Start Lyrica 25 mg BID  Recommend she schedule with Pain Management.  Workmen's Comp. is reportedly denying patient to see pain management.  I did advise that she can choose to see pain management but will have to be billed personal health insurance.  We will extend her leave of absence an additional 4 weeks but a plan of care must be made with pain management and [facility]

## 2024-07-12 NOTE — TELEPHONE ENCOUNTER
Caller: Amy Pyle    Relationship: Self    Best call back number: [phone]     What was the call regarding: PATIENT CALLED TO UPDATE THAT SHE IS SCHEDULED 9/20/24 @ 2:30 WITH DR BANEGAS IN PAIN MANAGEMENT, AND THAT WAS THE SOONEST THEY COULD SCHEDULE HER

## 2024-07-15 NOTE — TELEPHONE ENCOUNTER
Hub staff attempted to follow warm transfer process and was unsuccessful     Caller: Amy Pyle    Relationship to patient: Self    Best call back number:     [phone] (Mobile)       Patient is needing: RETURNING A CALL FROM [employer]

## 2024-07-17 ENCOUNTER — TELEPHONE (OUTPATIENT)
Dept: FAMILY MEDICINE CLINIC | Facility: CLINIC | Age: 59
End: 2024-07-17
Payer: COMMERCIAL

## 2024-07-17 NOTE — TELEPHONE ENCOUNTER
Caller: Amy Pyle    Relationship: Self    Best call back number: [phone]     What was the call regarding: PATIENT IS REQUESTING TO SPEAK WITH [employer] REGARDING LONG TERM DISABILITY INFORMATION    PLEASE ADVISE

## 2024-07-17 NOTE — TELEPHONE ENCOUNTER
Pt is having long term disability forms faxed. She met with an  today and they advised that she start this process. Just wanted to make you aware that this was coming.

## 2024-07-17 NOTE — TELEPHONE ENCOUNTER
Let her know long term disability can only be filled out after a functional capacity exam. She can schedule at [facility], [place] or [facility], [place] ([place]) I think.

## 2024-07-17 NOTE — TELEPHONE ENCOUNTER
I did tell her a functional capacity exam would need to be completed and told her about [facility]. I also advised that her  should be able to give her information on location as well.

## 2024-07-22 ENCOUNTER — TELEPHONE (OUTPATIENT)
Dept: FAMILY MEDICINE CLINIC | Facility: CLINIC | Age: 59
End: 2024-07-22
Payer: COMMERCIAL

## 2024-07-22 NOTE — TELEPHONE ENCOUNTER
Caller: Amy Pyle    Relationship: Self    Best call back number: [phone]     What was the call regarding: PATIENT STATES THAT HER LONG TERM DISABILITY HAD FAXED THE ATTENDANT PHYSICIAN FORM TO [employer]AE [employer], BUT HAD NOT RECEIVED A RESPONSE. PATIENT WANTED TO MAKE SURE IT WAS BEING ADDRESSED

## 2024-07-25 ENCOUNTER — HOSPITAL ENCOUNTER (OUTPATIENT)
Dept: MAMMOGRAPHY | Facility: HOSPITAL | Age: 59
Discharge: HOME | End: 2024-07-25
Admitting: NURSE PRACTITIONER
Payer: COMMERCIAL

## 2024-07-25 DIAGNOSIS — Z12.31 SCREENING MAMMOGRAM FOR BREAST CANCER: ICD-10-CM

## 2024-07-25 PROCEDURE — 77067 SCR MAMMO BI INCL CAD: CPT

## 2024-07-25 PROCEDURE — 77063 BREAST TOMOSYNTHESIS BI: CPT

## 2024-08-26 RX ORDER — BUDESONIDE, GLYCOPYRROLATE, AND FORMOTEROL FUMARATE 160; 9; 4.8 UG/1; UG/1; UG/1
2 AEROSOL, METERED RESPIRATORY (INHALATION) 2 TIMES DAILY
Qty: 8 G | Refills: 2 | Status: SHIPPED | OUTPATIENT
Start: 2024-08-26

## 2024-08-26 RX ORDER — ALBUTEROL SULFATE 90 UG/1
2 AEROSOL, METERED RESPIRATORY (INHALATION) EVERY 4 HOURS PRN
Qty: 1 G | Refills: 2 | Status: SHIPPED | OUTPATIENT
Start: 2024-08-26

## 2024-09-09 ENCOUNTER — TELEPHONE (OUTPATIENT)
Dept: FAMILY MEDICINE CLINIC | Facility: CLINIC | Age: 59
End: 2024-09-09

## 2024-09-09 ENCOUNTER — OFFICE VISIT (OUTPATIENT)
Dept: FAMILY MEDICINE CLINIC | Facility: CLINIC | Age: 59
End: 2024-09-09
Payer: COMMERCIAL

## 2024-09-09 VITALS
DIASTOLIC BLOOD PRESSURE: 70 MMHG | HEIGHT: 67 IN | WEIGHT: 206 LBS | BODY MASS INDEX: 32.33 KG/M2 | OXYGEN SATURATION: 95 % | SYSTOLIC BLOOD PRESSURE: 128 MMHG | HEART RATE: 86 BPM

## 2024-09-09 DIAGNOSIS — Z12.11 SCREENING FOR MALIGNANT NEOPLASM OF COLON: ICD-10-CM

## 2024-09-09 DIAGNOSIS — M54.16 LUMBAR RADICULOPATHY: ICD-10-CM

## 2024-09-09 DIAGNOSIS — R15.9 INCONTINENCE OF FECES, UNSPECIFIED FECAL INCONTINENCE TYPE: ICD-10-CM

## 2024-09-09 DIAGNOSIS — K59.00 CONSTIPATION, UNSPECIFIED CONSTIPATION TYPE: ICD-10-CM

## 2024-09-09 DIAGNOSIS — F33.1 MODERATE EPISODE OF RECURRENT MAJOR DEPRESSIVE DISORDER: ICD-10-CM

## 2024-09-09 DIAGNOSIS — J44.1 CHRONIC OBSTRUCTIVE PULMONARY DISEASE WITH ACUTE EXACERBATION: Primary | ICD-10-CM

## 2024-09-09 PROCEDURE — 99214 OFFICE O/P EST MOD 30 MIN: CPT | Performed by: NURSE PRACTITIONER

## 2024-09-09 RX ORDER — METHYLPREDNISOLONE 4 MG
TABLET, DOSE PACK ORAL
Qty: 21 TABLET | Refills: 0 | Status: SHIPPED | OUTPATIENT
Start: 2024-09-09

## 2024-09-09 RX ORDER — IPRATROPIUM BROMIDE AND ALBUTEROL SULFATE 2.5; .5 MG/3ML; MG/3ML
3 SOLUTION RESPIRATORY (INHALATION)
Qty: 1620 ML | Refills: 1 | Status: SHIPPED | OUTPATIENT
Start: 2024-09-09

## 2024-09-09 RX ORDER — DOXYCYCLINE 100 MG/1
100 CAPSULE ORAL 2 TIMES DAILY
Qty: 14 CAPSULE | Refills: 0 | Status: SHIPPED | OUTPATIENT
Start: 2024-09-09

## 2024-09-09 NOTE — PROGRESS NOTES
Chief Complaint  Shortness of Breath and Back Pain (Lower back pain now having loss of urine and bowels/cannot even feel herself going to the bathroom )    Subjective        Amy Pyle presents to [facility]  History of Present Illness    Patient presents with complaints of shortness of breath, loose cough .  She has a history of COPD, prescribed Breztri 2 puffs twice daily. Patient also taking Duoneb treatments PRN, out of medication. She reports Urology found pulmonary nodules on recent follow up scans following kidney CA  - plans for follow up in 4 months.     Patient still having ongoing low back pain.She was injured at work in September 2023. Patient referred to Pain Management and Workman's Comp physician. She underwent MRI spine, treated with NSAIDS and epidural injections. Note from 12/21/23: Patient injured at work on 9/13 and has been seeing [facility] occupational medicine. Patient completed physical therapy without relief. She is also using a TENS unit.  MRI of L-spine without contrast was completed on 11/6.  The findings are as follows: 1.  Lumbar spondylosis with multilevel neural foraminal stenosis and mild spinal canal stenosis at L3-L4. 2.  Complex/septated cystic lesion is seen anteriorly within the left renal midpole. Patient was reportedly cleared to return to work (specific to back pain) on 4/15/24 per Dr. Leon after one epidural. Patient returned to work, had progressing pain. She is also having weakness to right leg. Patient seen again by Dr. Leon on 6/10 - advised concerns were unrelated to acute injury, unsure if anything more could be done. Patient referred to spine surgery to reevaluate. SRepeat MRI of L-spine showed multilevel degenerative changes as characterized above, not significantly changed since prior study when accounting for differences in technique and motion artifact noted on current study. Patient evaluated by Dr. Bradshaw with  "[facility] - ordered a second opinion from Pain Management and Flexeril PRN pain. She does not get relief from muscle relaxants. Patient reports she is unable to bend to shave her legs, cannot walk through grocery store due to pain to the right side of her low back and into RLE.  Lyrica 25 mg twice daily was started previously and patient was advised to follow back up with pain management.  Patient was evaluated by [facility].  She was started on Zanaflex, Celebrex, Norco and amitriptyline.  Lyrica was changed to 50 mg 3 times daily.  They also started her on a compounding cream.  Additional x-rays were ordered and patient advised to follow-up in 6 weeks. Another injection planned, not yet scheduled.  Patient is tearful at visit, describes pain as intolerable.  She reports now having instances where she cannot hold her bowels or bladder.     Objective   Vital Signs:  /70 (BP Location: Left arm, Patient Position: Sitting, Cuff Size: Large Adult)   Pulse 86   Ht 170.2 cm (67\")   Wt 93.4 kg (206 lb)   SpO2 95%   BMI 32.26 kg/m²   Estimated body mass index is 32.26 kg/m² as calculated from the following:    Height as of this encounter: 170.2 cm (67\").    Weight as of this encounter: 93.4 kg (206 lb).            Physical Exam  Constitutional:       Appearance: Normal appearance.   HENT:      Head: Normocephalic.   Cardiovascular:      Rate and Rhythm: Normal rate and regular rhythm.   Pulmonary:      Effort: Pulmonary effort is normal.      Breath sounds: Normal breath sounds.   Abdominal:      General: Abdomen is flat. Bowel sounds are normal.      Palpations: Abdomen is soft.   Musculoskeletal:         General: Normal range of motion.      Cervical back: Neck supple.      Right lower leg: No edema.      Left lower leg: No edema.   Skin:     General: Skin is warm and dry.   Neurological:      Mental Status: She is alert and oriented to person, place, and time.      Gait: " Gait is intact.   Psychiatric:         Attention and Perception: Attention normal.         Mood and Affect: Mood normal.         Speech: Speech normal.        Result Review :                Assessment and Plan   Diagnoses and all orders for this visit:    1. Chronic obstructive pulmonary disease with acute exacerbation (Primary)  Assessment & Plan:  DuoNeb treatment administered at visit  Start doxycycline and Medrol Dosepak  Referred to pulmonology      Orders:  -     Ambulatory Referral to Pulmonology    2. Screening for malignant neoplasm of colon  -     ipratropium-albuterol (DUO-NEB) 0.5-2.5 mg/3 ml nebulizer; Take 3 mL by nebulization 4 (Four) Times a Day.  Dispense: 1620 mL; Refill: 1    3. Incontinence of feces, unspecified fecal incontinence type  Assessment & Plan:  KUB to rule out constipation  Refer for colonoscopy    Orders:  -     Ambulatory Referral For Screening Colonoscopy  -     XR Abdomen KUB; Future    4. Constipation, unspecified constipation type  -     XR Abdomen KUB; Future    5. Moderate episode of recurrent major depressive disorder  Assessment & Plan:  Continue Zoloft as prescribed  I do feel that patient's physical decline has affected her psychosocially.  She would benefit from counseling, reports she has started talking to someone at her employers.  Patient will be referred to psychiatry.    Orders:  -     Ambulatory Referral to Psychiatry    6. Lumbar radiculopathy  Assessment & Plan:  Reviewed pain management visit note  Recommended she schedule injection and follow-up visit  Patient has been off work since May, cannot ambulate without pain, bend or twist.  She is requesting long-term disability forms be completed      Other orders  -     methylPREDNISolone (MEDROL) 4 MG dose pack; Take as directed on package instructions.  Dispense: 21 tablet; Refill: 0  -     doxycycline (VIBRAMYCIN) 100 MG capsule; Take 1 capsule by mouth 2 (Two) Times a Day.  Dispense: 14 capsule; Refill:  0           I spent 30 minutes caring for Amy on this date of service. This time includes time spent by me in the following activities:preparing for the visit, reviewing tests, obtaining and/or reviewing a separately obtained history, performing a medically appropriate examination and/or evaluation , counseling and educating the patient/family/caregiver, ordering medications, tests, or procedures, referring and communicating with other health care professionals , documenting information in the medical record, and care coordination  Follow Up   Return in about 3 months (around 12/9/2024) for Annual physical.  Patient was given instructions and counseling regarding her condition or for health maintenance advice. Please see specific information pulled into the AVS if appropriate.

## 2024-09-09 NOTE — TELEPHONE ENCOUNTER
Attempted to call pt r/t to moving her 9/18 physical appt out 3 months, no answer: per verbal left msg for pt to call and reschedule appt

## 2024-09-10 PROBLEM — K59.00 CONSTIPATION: Status: ACTIVE | Noted: 2024-09-10

## 2024-09-10 PROBLEM — Z12.11 SCREENING FOR MALIGNANT NEOPLASM OF COLON: Status: ACTIVE | Noted: 2024-09-10

## 2024-09-10 PROBLEM — R15.9 INCONTINENCE OF FECES: Status: ACTIVE | Noted: 2024-09-10

## 2024-09-10 RX ORDER — PREGABALIN 25 MG/1
50 CAPSULE ORAL 3 TIMES DAILY
Qty: 180 CAPSULE | Refills: 0 | Status: SHIPPED | OUTPATIENT
Start: 2024-09-10 | End: 2024-10-10

## 2024-09-10 NOTE — ASSESSMENT & PLAN NOTE
Reviewed pain management visit note  Recommended she schedule injection and follow-up visit  Patient has been off work since May, cannot ambulate without pain, bend or twist.  She is requesting long-term disability forms be completed

## 2024-09-10 NOTE — ASSESSMENT & PLAN NOTE
DuoNeb treatment administered at visit  Start doxycycline and Medrol Dosepak  Referred to pulmonology

## 2024-09-10 NOTE — ASSESSMENT & PLAN NOTE
Continue Zoloft as prescribed  I do feel that patient's physical decline has affected her psychosocially.  She would benefit from counseling, reports she has started talking to someone at her employers.  Patient will be referred to psychiatry.

## 2024-11-04 ENCOUNTER — TELEPHONE (OUTPATIENT)
Dept: FAMILY MEDICINE CLINIC | Facility: CLINIC | Age: 59
End: 2024-11-04
Payer: COMMERCIAL

## 2024-11-04 NOTE — TELEPHONE ENCOUNTER
LVM regarding overdue xray abd KUB order. Inquiring if has been completed or is still needing to complete. Let the office know if feeling better and no longer needed.    Relay

## 2025-01-10 ENCOUNTER — OFFICE VISIT (OUTPATIENT)
Age: 60
End: 2025-01-10
Payer: COMMERCIAL

## 2025-01-10 VITALS
DIASTOLIC BLOOD PRESSURE: 84 MMHG | WEIGHT: 217 LBS | TEMPERATURE: 98.3 F | SYSTOLIC BLOOD PRESSURE: 113 MMHG | HEIGHT: 67 IN | OXYGEN SATURATION: 98 % | BODY MASS INDEX: 34.06 KG/M2 | HEART RATE: 88 BPM

## 2025-01-10 DIAGNOSIS — K59.02 CONSTIPATION DUE TO OUTLET DYSFUNCTION: Primary | ICD-10-CM

## 2025-01-10 DIAGNOSIS — R15.9 FULL INCONTINENCE OF FECES: ICD-10-CM

## 2025-01-10 DIAGNOSIS — K64.8 BLEEDING INTERNAL HEMORRHOIDS: ICD-10-CM

## 2025-01-10 DIAGNOSIS — M62.838 MUSCLE SPASM: ICD-10-CM

## 2025-01-10 NOTE — PROGRESS NOTES
Colorectal Surgery Consultation Note    ID:  Amy Pyle;   : 1965  DATE OF VISIT: 1/10/2025    Chief Complaint  New Patient  (NP referred by ELPIDIO Granger for irregular bowel movements )       History of Present Illness  Amy Pyle is a 59 y.o. female who I was asked to see in consultation by Dr. Kandi karimi. provider found to evaluate for fecal incontinence.    Patient states she has a bowel movement 1-2 per day with significant straining. She reports small pallets of stool every time she goes to the bathroom. She has tried mirilax, colace and Mg Citrate. She also reports fecal incontinence almost daily. She does not wear pads or diaper but she doesn't leave her house unless its a doctors appointment.       Last colonoscopy: small polyps . She had hysterectomy previously.     She struggles with back pain and muscle spasm. She doesn't not take any narcotic pain meds.      Never Less than once a month Once a month or more, but less than once a week Once a week or more, but less than once a day Daily   Points 0 1 2 3 4   Cannot control:  solid stool     x   Cannot control:  liquid stool     x   Cannot control:  gas     x   Need to wear a pad x       Need to alter your lifestyle x         Score:      Past Medical History  Past Medical History:   Diagnosis Date    Acute right-sided low back pain without sciatica 2021    Anxiety     Asthma     Chronic obstructive pulmonary disease 2021    Constipation     COPD (chronic obstructive pulmonary disease)     COVID-19 2022    COVID-19 long hauler 08/15/2022    Dysthymia 2021    Hallucination, visual 10/24/2022    Heart palpitations     Leukocytosis 2021    Mixed hyperlipidemia 2021     Past Surgical History  Past Surgical History:   Procedure Laterality Date    BLADDER REPAIR      HYSTERECTOMY      KIDNEY SURGERY       Family History  Family History   Problem Relation Age of Onset    Breast cancer Mother     Heart disease  Mother     Heart disease Father     Diabetes Father     Breast cancer Paternal Aunt      No colorectal cancer history in immediate family members.  Social History  Social History     Tobacco Use    Smoking status: Former     Current packs/day: 0.00     Average packs/day: 0.5 packs/day for 40.0 years (20.0 ttl pk-yrs)     Types: Cigarettes     Start date: 3/12/1982     Quit date: 3/12/2022     Years since quittin.8     Passive exposure: Past    Smokeless tobacco: Never   Vaping Use    Vaping status: Never Used   Substance Use Topics    Alcohol use: Yes     Comment: ocassionally     Drug use: Never     For further details please see Health History Questionnaire scanned in Epic.  Medication List    Current Outpatient Medications:     albuterol sulfate  (90 Base) MCG/ACT inhaler, Inhale 2 puffs Every 4 (Four) Hours As Needed for Wheezing., Disp: 1 g, Rfl: 2    Budeson-Glycopyrrol-Formoterol (Breztri Aerosphere) 160-9-4.8 MCG/ACT aerosol inhaler, Inhale 2 puffs 2 (Two) Times a Day., Disp: 8 g, Rfl: 2    ipratropium-albuterol (DUO-NEB) 0.5-2.5 mg/3 ml nebulizer, Take 3 mL by nebulization 4 (Four) Times a Day., Disp: 1620 mL, Rfl: 1    sertraline (Zoloft) 50 MG tablet, Take 1 tablet by mouth Daily., Disp: 30 tablet, Rfl: 2    doxycycline (VIBRAMYCIN) 100 MG capsule, Take 1 capsule by mouth 2 (Two) Times a Day. (Patient not taking: Reported on 1/10/2025), Disp: 14 capsule, Rfl: 0    methylPREDNISolone (MEDROL) 4 MG dose pack, Take as directed on package instructions. (Patient not taking: Reported on 1/10/2025), Disp: 21 tablet, Rfl: 0    naproxen (NAPROSYN) 250 MG tablet, Take 1 tablet by mouth 2 (Two) Times a Day As Needed for Mild Pain. (Patient not taking: Reported on 1/10/2025), Disp: 60 tablet, Rfl: 0    pregabalin (Lyrica) 25 MG capsule, Take 2 capsules by mouth 3 (Three) Times a Day for 30 days., Disp: 180 capsule, Rfl: 0    traMADol (Ultram) 50 MG tablet, Take 1 tablet by mouth Every 8 (Eight) Hours As  Needed for Moderate Pain. (Patient not taking: Reported on 1/10/2025), Disp: 30 tablet, Rfl: 0   Allergies  No Known Allergies  Review of Systems  All systems reviewed and are otherwise negative, pertinent positives noted in the HPI and Health History Questionnaire scanned in Epic.      Physical Exam  General:  No acute distress  Head: Normocephalic, atraumatic  Neuro: Alert and oriented    Abdomen:  Soft, non-tender, non-distended, no masses, no hernias, no hepatomegaly, no splenomegaly. No abnormal, audible bowel sounds.    External anorectal exam: moderate skin irritation, external tags located in all side . Sensation diminished no anal wink present. No significant perineal descent. Visible rectocele   Digital rectal: no tenderness with exam, no palpable masses, tone is slightly decreased. Spastic levator muscle with paradoxical muscle contraction     Procedure Note  Procedure: anoscopy   Indication: fecal incontinence  Description: After digital examination was completed the scope was inserted into the anal canal. The rectum/colon was visualized to 8cm. See Findings below. The scope was then removed. Patient tolerated procedure well.  Findings: prominent, prolapsing internal hemorrhoids in the classic quadrants, otherwise no other mucosal lesions to 8 cm (distal rectum).    Assessment  -Fecal incontinence  - constipation from dyssynergic defecation    -Symptomatic internal hemorrhoids  -Pruritus ani    Plan / Recommendations    1. I explained to the patient that fecal incontinence can generally be placed into 3 categories based on the cause- sphincter problems (of which nerve conduction/sensation problems and muscle damage are included), irregular or loose bowel movements, and prolapse of tissue which stents the sphincters open and doesn't allow for complete closure. Often times, there is a combination of all of these causes that leads to fecal incontinence.      2.  Given her significant constipation in  addition to fecal incontinence, I explained that the first step would be to try to normalize her stools by adding supplemental fiber in the form of Konsyl or Metamucil or any of the other psyllium based products. I have recommended that she start by taking 2 teaspoons in a glass of water once per day for a week and to gradually work up to taking it twice per day. I explained that it is normal to have increased gas and bloating when first starting on fiber, but that this ought to get better after ~ 3 weeks to 3 months.     3. Patient may also benefit from an enema to be performed in the morning after a BM to more completely evacuate the rectum and distal colon.    3. We will get sitz marker study to r/o colonic inertia. We will hold off on vidoe defecography due to her back pain.      4. If necessary, pelvic PT and biofeedback can be performed after the above interventions are made.    5. Handouts on fecal incontinence, hemorrhoids and fiber were given to the patient along with a follow-up appointment to see me in 2 months.      Nikhil Burns MD  Colon and Rectal Surgery   [facility]

## 2025-01-14 ENCOUNTER — TELEPHONE (OUTPATIENT)
Dept: FAMILY MEDICINE CLINIC | Facility: CLINIC | Age: 60
End: 2025-01-14
Payer: COMMERCIAL

## 2025-01-14 NOTE — TELEPHONE ENCOUNTER
Pt is scheduled for March 11 for her follow up appt and she is requesting a sooner appt. Please call her with a sooner time she is willing to do any day and time.

## 2025-01-17 NOTE — TELEPHONE ENCOUNTER
Called pt rescheduled appt to 2/24 @9:45, informed if something comes sooner we will call and see about getting that appt moved

## 2025-02-06 ENCOUNTER — OFFICE VISIT (OUTPATIENT)
Dept: FAMILY MEDICINE CLINIC | Facility: CLINIC | Age: 60
End: 2025-02-06
Payer: COMMERCIAL

## 2025-02-06 ENCOUNTER — LAB (OUTPATIENT)
Dept: FAMILY MEDICINE CLINIC | Facility: CLINIC | Age: 60
End: 2025-02-06
Payer: COMMERCIAL

## 2025-02-06 VITALS
OXYGEN SATURATION: 88 % | DIASTOLIC BLOOD PRESSURE: 70 MMHG | BODY MASS INDEX: 34.21 KG/M2 | HEART RATE: 77 BPM | HEIGHT: 67 IN | SYSTOLIC BLOOD PRESSURE: 128 MMHG | WEIGHT: 218 LBS

## 2025-02-06 DIAGNOSIS — J43.8 OTHER EMPHYSEMA: Primary | ICD-10-CM

## 2025-02-06 DIAGNOSIS — E55.9 VITAMIN D DEFICIENCY: ICD-10-CM

## 2025-02-06 DIAGNOSIS — K59.00 CONSTIPATION, UNSPECIFIED CONSTIPATION TYPE: ICD-10-CM

## 2025-02-06 DIAGNOSIS — Z00.00 ANNUAL PHYSICAL EXAM: ICD-10-CM

## 2025-02-06 DIAGNOSIS — F33.1 MODERATE EPISODE OF RECURRENT MAJOR DEPRESSIVE DISORDER: ICD-10-CM

## 2025-02-06 DIAGNOSIS — Z12.2 ENCOUNTER FOR SCREENING FOR LUNG CANCER: ICD-10-CM

## 2025-02-06 DIAGNOSIS — M54.16 LUMBAR RADICULOPATHY: ICD-10-CM

## 2025-02-06 DIAGNOSIS — M51.362 DEGENERATION OF INTERVERTEBRAL DISC OF LUMBAR REGION WITH DISCOGENIC BACK PAIN AND LOWER EXTREMITY PAIN: ICD-10-CM

## 2025-02-06 DIAGNOSIS — Z90.5 S/P NEPHRECTOMY: ICD-10-CM

## 2025-02-06 LAB
DEPRECATED RDW RBC AUTO: 40.5 FL (ref 37–54)
ERYTHROCYTE [DISTWIDTH] IN BLOOD BY AUTOMATED COUNT: 12.8 % (ref 12.3–15.4)
HCT VFR BLD AUTO: 45 % (ref 34–46.6)
HGB BLD-MCNC: 14.2 G/DL (ref 12–15.9)
MCH RBC QN AUTO: 27.5 PG (ref 26.6–33)
MCHC RBC AUTO-ENTMCNC: 31.6 G/DL (ref 31.5–35.7)
MCV RBC AUTO: 87 FL (ref 79–97)
PLATELET # BLD AUTO: 347 10*3/MM3 (ref 140–450)
PMV BLD AUTO: 10.6 FL (ref 6–12)
RBC # BLD AUTO: 5.17 10*6/MM3 (ref 3.77–5.28)
WBC NRBC COR # BLD AUTO: 10.4 10*3/MM3 (ref 3.4–10.8)

## 2025-02-06 PROCEDURE — 83036 HEMOGLOBIN GLYCOSYLATED A1C: CPT | Performed by: NURSE PRACTITIONER

## 2025-02-06 PROCEDURE — 99213 OFFICE O/P EST LOW 20 MIN: CPT | Performed by: NURSE PRACTITIONER

## 2025-02-06 PROCEDURE — 36415 COLL VENOUS BLD VENIPUNCTURE: CPT | Performed by: NURSE PRACTITIONER

## 2025-02-06 PROCEDURE — 82306 VITAMIN D 25 HYDROXY: CPT | Performed by: NURSE PRACTITIONER

## 2025-02-06 PROCEDURE — 80053 COMPREHEN METABOLIC PANEL: CPT | Performed by: NURSE PRACTITIONER

## 2025-02-06 PROCEDURE — 85027 COMPLETE CBC AUTOMATED: CPT | Performed by: NURSE PRACTITIONER

## 2025-02-06 PROCEDURE — 84443 ASSAY THYROID STIM HORMONE: CPT | Performed by: NURSE PRACTITIONER

## 2025-02-06 PROCEDURE — 99396 PREV VISIT EST AGE 40-64: CPT | Performed by: NURSE PRACTITIONER

## 2025-02-06 PROCEDURE — 80061 LIPID PANEL: CPT | Performed by: NURSE PRACTITIONER

## 2025-02-06 RX ORDER — SERTRALINE HYDROCHLORIDE 100 MG/1
100 TABLET, FILM COATED ORAL DAILY
Qty: 30 TABLET | Refills: 2 | Status: SHIPPED | OUTPATIENT
Start: 2025-02-06

## 2025-02-06 NOTE — ASSESSMENT & PLAN NOTE
Mammogram up to date  Colonoscopy has been ordered, followed by Dr. Burns  Labs today  Routine vision and dental screenings advised  Well balanced diet recommended

## 2025-02-06 NOTE — ASSESSMENT & PLAN NOTE
Patient has been denied for further evaluation by Workman's Comp. The [place] Workman's Comp provider feels that another opinion by Neurosurgery is appropriate. I have advised patient that while I can refer her to another surgeon, it will not be covered by Workman's Comp as I am not a Workman's Comp provider and they do not associate this pain with her initial injury. She wants to discuss first with insurance

## 2025-02-06 NOTE — ASSESSMENT & PLAN NOTE
Continue Breztri 2 puffs BID  Recommend Duoneb treatments 3 times daily  Lung CA screening due  Order supplemental oxygen due to low saturation  Follow up with Pulmonology

## 2025-02-06 NOTE — PROGRESS NOTES
Chief Complaint  Annual Exam (Discuss disability and the testing she had completed with the spine surgeon. )    Subjective        Amy Pyle presents to [facility]  History of Present Illness    Patient presents for Annual Exam. PMH includes hyperlipidemia, constipation, renal cancer, depression, chronic back pain, COPD and insomnia.    COPD, prescribed Breztri 2 puffs twice daily.  She also has DuoNeb treatments to use as needed.  Patient does have a history of pulmonary nodules. She is due for Lung CA screening. Patient describes productive cough, wheezing and dyspnea. RA oxygen 88%.     Moderate to severe depression, prescribed Zoloft 50 mg daily. PHQ-9 below. Patient denies SI, does at times feel that it would be easier if she were gone.     She has severe chronic low back pain.  Patient was injured at work in September 2023.  She underwent MRI spine, treated with NSAIDS and epidural injections. Note from 12/21/23: Patient injured at work on 9/13 and has been seeing [facility] occupational medicine. Patient completed physical therapy without relief. She is also using a TENS unit.  MRI of L-spine without contrast was completed on 11/6.  The findings are as follows: 1.  Lumbar spondylosis with multilevel neural foraminal stenosis and mild spinal canal stenosis at L3-L4. 2.  Complex/septated cystic lesion is seen anteriorly within the left renal midpole. Patient also underwent partial nephrectomy r/t CA. She is followed by Dr. Allen. Patient was reportedly cleared to return to work (specific to back pain) on 4/15/24 per Dr. Leon after one epidural. Patient returned to work, had progressing pain. She also has weakness to right leg. Patient seen again by Dr. Leon on 6/10 - advised concerns were unrelated to acute injury, unsure if anything more could be done. Patient referred to spine surgery to reevaluate. Repeat MRI of L-spine showed multilevel degenerative changes as  characterized above, not significantly changed since prior study when accounting for differences in technique and motion artifact noted on current study. Patient evaluated by Dr. Bradshaw with [facility] - ordered a second opinion from Pain Management and Flexeril PRN pain. She does not get relief from muscle relaxants.  Patient was evaluated by [facility].  She was started on Zanaflex, Celebrex, Norco and amitriptyline.  Lyrica was changed to 50 mg 3 times daily.  They also started her on a compounding cream.  Additional x-rays were ordered and patient advised to follow-up in 6 weeks. Another injection planned but follow up has been lost because Abhijeet advised they cannot treat her due to not being original pain management provider.  Dr. Leon is the original provider but has left practice. A Dr. You (Ortho, Workman's Comp) - recommendation were to consult a spinal surgeon to evaluate her for surgical treatment at the L3-L4 level. This evaluation is dated 12/30/24. She has also filed for social security disability.     Patient reported at last visit having instances where she cannot hold her bowels or bladder.  Patient referred to Colorectal surgery and a KUB.  KUB was compatible with constipation.  She met with colorectal surgery, advised treatment of constipation.  Sitz marker study was also ordered to rule out colonic inertia.  Pelvic therapy also considered. Patient is taking Magnesium and Miralax as recommended, will follow up in April.     PHQ-9 Depression Screening  Little interest or pleasure in doing things? Almost all   Feeling down, depressed, or hopeless? Almost all   PHQ-2 Total Score 6   Trouble falling or staying asleep, or sleeping too much? Almost all   Feeling tired or having little energy? Almost all   Poor appetite or overeating? Almost all   Feeling bad about yourself - or that you are a failure or have let yourself or your family down? Almost all  "  Trouble concentrating on things, such as reading the newspaper or watching television? Almost all   Moving or speaking so slowly that other people could have noticed? Or the opposite - being so fidgety or restless that you have been moving around a lot more than usual? Not at all   Thoughts that you would be better off dead, or of hurting yourself in some way? Over half   PHQ-9 Total Score 23   If you checked off any problems, how difficult have these problems made it for you to do your work, take care of things at home, or get along with other people? Somewhat difficult      Objective   Vital Signs:  /70 (BP Location: Left arm, Patient Position: Sitting, Cuff Size: Adult)   Pulse 77   Ht 170.2 cm (67\")   Wt 98.9 kg (218 lb)   SpO2 (!) 88%   BMI 34.14 kg/m²   Estimated body mass index is 34.14 kg/m² as calculated from the following:    Height as of this encounter: 170.2 cm (67\").    Weight as of this encounter: 98.9 kg (218 lb).          Physical Exam  Constitutional:       Appearance: Normal appearance. She is obese.   HENT:      Head: Normocephalic.   Cardiovascular:      Rate and Rhythm: Normal rate and regular rhythm.   Pulmonary:      Effort: Pulmonary effort is normal.      Breath sounds: Normal breath sounds.   Abdominal:      General: Abdomen is flat. Bowel sounds are normal.      Palpations: Abdomen is soft.   Musculoskeletal:         General: Normal range of motion.      Cervical back: Neck supple.      Right lower leg: No edema.      Left lower leg: No edema.   Skin:     General: Skin is warm and dry.   Neurological:      Mental Status: She is alert and oriented to person, place, and time.      Gait: Gait is intact.   Psychiatric:         Attention and Perception: Attention normal.         Mood and Affect: Mood normal.         Speech: Speech normal.        Result Review :                        Assessment and Plan   Diagnoses and all orders for this visit:    1. Other emphysema " (Primary)  Assessment & Plan:  Continue Breztri 2 puffs BID  Recommend Duoneb treatments 3 times daily  Lung CA screening due  Order supplemental oxygen due to low saturation  Follow up with Pulmonology    Orders:  -     Oxygen Therapy    2. Lumbar radiculopathy    3. Constipation, unspecified constipation type  Assessment & Plan:  Continue treatment regimen per Dr. Burns      4. Moderate episode of recurrent major depressive disorder  Assessment & Plan:  Patient's depression is a recurrent episode that is moderate without psychosis. Depression is active and worsening.    Plan:   Medication  dose was adjusted;  increase Zoloft to 100 mg daily    Followup at the next regular appointment.     Orders:  -     sertraline (Zoloft) 100 MG tablet; Take 1 tablet by mouth Daily.  Dispense: 30 tablet; Refill: 2    5. Degeneration of intervertebral disc of lumbar region with discogenic back pain and lower extremity pain  Assessment & Plan:  Patient has been denied for further evaluation by Workman's Comp. The [place] Workman's Comp provider feels that another opinion by Neurosurgery is appropriate. I have advised patient that while I can refer her to another surgeon, it will not be covered by Workman's Comp as I am not a Workman's Comp provider and they do not associate this pain with her initial injury. She wants to discuss first with insurance      6. S/p nephrectomy  Assessment & Plan:  Follow up with Nephrology      7. Annual physical exam  Assessment & Plan:  Mammogram up to date  Colonoscopy has been ordered, followed by Dr. Burns  Labs today  Routine vision and dental screenings advised  Well balanced diet recommended    Orders:  -     CBC (No Diff)  -     Comprehensive Metabolic Panel  -     Hemoglobin A1c  -     Lipid Panel  -     TSH    8. Encounter for screening for lung cancer  -      CT Chest Low Dose Cancer Screening WO; Future    9. Vitamin D deficiency  -     Vitamin D,25-Hydroxy           I spent 35 minutes caring  bert Reyes on this date of service. This time includes time spent by me in the following activities:preparing for the visit, reviewing tests, obtaining and/or reviewing a separately obtained history, performing a medically appropriate examination and/or evaluation , counseling and educating the patient/family/caregiver, ordering medications, tests, or procedures, documenting information in the medical record, and care coordination  Follow Up   Return in about 3 months (around 5/6/2025) for COPD.  Patient was given instructions and counseling regarding her condition or for health maintenance advice. Please see specific information pulled into the AVS if appropriate.     Counseling was given to patient for the following topics: diagnostic results, instructions for management, risk factor reductions, prognosis, patient and family education, impressions, risks and benefits of treatment options, and importance of treatment compliance . Total time of the encounter was 30 minutes and 5 minutes was spent counseling.

## 2025-02-06 NOTE — ASSESSMENT & PLAN NOTE
Patient's depression is a recurrent episode that is moderate without psychosis. Depression is active and worsening.    Plan:   Medication  dose was adjusted;  increase Zoloft to 100 mg daily    Followup at the next regular appointment.

## 2025-02-07 ENCOUNTER — TELEPHONE (OUTPATIENT)
Dept: FAMILY MEDICINE CLINIC | Facility: CLINIC | Age: 60
End: 2025-02-07
Payer: COMMERCIAL

## 2025-02-07 LAB
25(OH)D3 SERPL-MCNC: 16.5 NG/ML (ref 30–100)
ALBUMIN SERPL-MCNC: 4.4 G/DL (ref 3.5–5.2)
ALBUMIN/GLOB SERPL: 1.5 G/DL
ALP SERPL-CCNC: 58 U/L (ref 39–117)
ALT SERPL W P-5'-P-CCNC: 11 U/L (ref 1–33)
ANION GAP SERPL CALCULATED.3IONS-SCNC: 9.6 MMOL/L (ref 5–15)
AST SERPL-CCNC: 15 U/L (ref 1–32)
BILIRUB SERPL-MCNC: 0.5 MG/DL (ref 0–1.2)
BUN SERPL-MCNC: 9 MG/DL (ref 8–23)
BUN/CREAT SERPL: 12.3 (ref 7–25)
CALCIUM SPEC-SCNC: 9.2 MG/DL (ref 8.6–10.5)
CHLORIDE SERPL-SCNC: 104 MMOL/L (ref 98–107)
CHOLEST SERPL-MCNC: 283 MG/DL (ref 0–200)
CO2 SERPL-SCNC: 26.4 MMOL/L (ref 22–29)
CREAT SERPL-MCNC: 0.73 MG/DL (ref 0.57–1)
EGFRCR SERPLBLD CKD-EPI 2021: 93.7 ML/MIN/1.73
GLOBULIN UR ELPH-MCNC: 2.9 GM/DL
GLUCOSE SERPL-MCNC: 103 MG/DL (ref 65–99)
HBA1C MFR BLD: 5.7 % (ref 4.8–5.6)
HDLC SERPL-MCNC: 54 MG/DL (ref 40–60)
LDLC SERPL CALC-MCNC: 201 MG/DL (ref 0–100)
LDLC/HDLC SERPL: 3.69 {RATIO}
POTASSIUM SERPL-SCNC: 4.5 MMOL/L (ref 3.5–5.2)
PROT SERPL-MCNC: 7.3 G/DL (ref 6–8.5)
SODIUM SERPL-SCNC: 140 MMOL/L (ref 136–145)
TRIGL SERPL-MCNC: 149 MG/DL (ref 0–150)
TSH SERPL DL<=0.05 MIU/L-ACNC: 1 UIU/ML (ref 0.27–4.2)
VLDLC SERPL-MCNC: 28 MG/DL (ref 5–40)

## 2025-02-07 RX ORDER — ACETAMINOPHEN 160 MG
2000 TABLET,DISINTEGRATING ORAL DAILY
Qty: 30 CAPSULE | Refills: 11 | Status: SHIPPED | OUTPATIENT
Start: 2025-02-07 | End: 2026-02-07

## 2025-02-07 NOTE — TELEPHONE ENCOUNTER
Gaye with [facility] reported that patient declined oxygen due to cost. Patient has not met her deductible and can not pay for therapy. Gaye states that their financial programs to not aide with deductibles.

## 2025-02-27 ENCOUNTER — TELEPHONE (OUTPATIENT)
Dept: FAMILY MEDICINE CLINIC | Facility: CLINIC | Age: 60
End: 2025-02-27
Payer: COMMERCIAL

## 2025-02-27 NOTE — TELEPHONE ENCOUNTER
I do not see a CT scan of her chest in the chart.  If done recently, what is the date and where the results?

## 2025-02-27 NOTE — TELEPHONE ENCOUNTER
She stated that Dr Allen's office did a CT yesterday. She said it was a CT with and without contrast for chest, abdomen and pelvis for a recheck on her kidney cancer. She has an appt with follow up with them on 3/6/25 to go over results. She said they did it at their office. I can call and request results. She is wondering since it included the chest if she can cancel the one that is order on the 6th since that is when she follows up with them regarding the results.

## 2025-02-27 NOTE — TELEPHONE ENCOUNTER
Left message for patient to let her know once we get the scan we will let her know if the additional scan is recommended or not.

## 2025-02-27 NOTE — TELEPHONE ENCOUNTER
Depends on how the test was ordered.  If it was a CT chest specifically, then no additional testing will be needed.  If it was a CT of abdomen and pelvis that just got the lower part of the chest, we will probably have to proceed.  Tell her I will review the results and let her know

## 2025-02-27 NOTE — TELEPHONE ENCOUNTER
Caller: Amy Pyle    Relationship: Self    Best call back number:     Amy Pyle (Self) [phone] (Mobile)     What was the call regarding: PATIENT HAD A CT SCAN DONE RECENTLY AND ALSO HAS ANOTHER ONE SCHEDULE 3-6-25    DOES SHE NEED THE 2ND CT SCAN, OR CAN YOU USE THE LAST ONE THAT WAS DONE?       Is it okay if the provider responds through MyChart: CALL BACK

## 2025-03-07 ENCOUNTER — TELEPHONE (OUTPATIENT)
Dept: FAMILY MEDICINE CLINIC | Facility: CLINIC | Age: 60
End: 2025-03-07
Payer: COMMERCIAL

## 2025-03-07 NOTE — TELEPHONE ENCOUNTER
Caller: Amy Pyle    Relationship: Self    Best call back number:     [phone] (Mobile)       What was the call regarding: DID YOU RECEIVE THE CT RESULTS YET?     IF NOT, SHE HAS A COPY AND CAN BRING IT TO YOU     Is it okay if the provider responds through MyChart: CALL

## 2025-03-07 NOTE — TELEPHONE ENCOUNTER
Spoke with patient and let her know that we did not get a copy yet. She is going to bring a copy for Mariaelena to review.

## 2025-03-18 ENCOUNTER — TELEPHONE (OUTPATIENT)
Age: 60
End: 2025-03-18
Payer: COMMERCIAL

## 2025-03-18 NOTE — TELEPHONE ENCOUNTER
Lvm for pt to r/s appt 4/2. Katy obduliant gong to be in clinic that day due to surgery. She can be doris 4/4

## 2025-04-04 ENCOUNTER — OFFICE VISIT (OUTPATIENT)
Age: 60
End: 2025-04-04
Payer: COMMERCIAL

## 2025-04-04 VITALS
SYSTOLIC BLOOD PRESSURE: 147 MMHG | TEMPERATURE: 98.4 F | HEART RATE: 91 BPM | HEIGHT: 67 IN | WEIGHT: 217 LBS | BODY MASS INDEX: 34.06 KG/M2 | DIASTOLIC BLOOD PRESSURE: 93 MMHG | OXYGEN SATURATION: 98 %

## 2025-04-04 DIAGNOSIS — K59.02 CONSTIPATION DUE TO OUTLET DYSFUNCTION: ICD-10-CM

## 2025-04-04 DIAGNOSIS — R15.9 FULL INCONTINENCE OF FECES: Primary | ICD-10-CM

## 2025-04-04 DIAGNOSIS — K64.8 BLEEDING INTERNAL HEMORRHOIDS: ICD-10-CM

## 2025-04-04 RX ORDER — LACTULOSE 10 G/15ML
20 SOLUTION ORAL 2 TIMES DAILY
Qty: 30 ML | Refills: 0 | Status: SHIPPED | OUTPATIENT
Start: 2025-04-04

## 2025-04-06 NOTE — H&P (VIEW-ONLY)
Colorectal Surgery Followup Note    ID:  Amy Pyle;   : 1965  DATE OF VISIT: 2025    Chief Complaint  Follow-up (Follow up on irregular bowel habits )       Subjective    Continues to experience severe constipation and incontinence. She feels her abdomen is more distended. She reports passing flatus. She reports discomfort while  having BM.   Exam  General:  No acute distress  Head: Normocephalic, atraumatic  Neuro: Alert and oriented    Abdomen:  Soft, non-tender, non-distended, no hernias, no hepatomegaly, no splenomegaly. No abnormal, audible bowel sounds.    Assessment  - -Fecal incontinence  - constipation from dyssynergic defecation    -Symptomatic internal hemorrhoids  -Pruritus ani    Plan / Recommendations  -We will proceed to endoscopy for colonoscopy to r/o any luminal mass causing obstruction   - referral to pelvic PT sent.  - recommended 10 cc lactulose+ 10cc mineral oil + 10 MOM. Prescription sent   - recommend staying on metamucil fiber and miralax after   - follow up at the time of colonoscopy       Nikhil Burns MD  Colon and Rectal Surgery   [facility]

## 2025-04-11 ENCOUNTER — HOSPITAL ENCOUNTER (OUTPATIENT)
Facility: HOSPITAL | Age: 60
Setting detail: THERAPIES SERIES
Discharge: HOME | End: 2025-04-11
Payer: COMMERCIAL

## 2025-04-11 DIAGNOSIS — M62.89 MUSCLE TIGHTNESS: ICD-10-CM

## 2025-04-11 DIAGNOSIS — K59.02 DYSSYNERGIC DEFECATION: ICD-10-CM

## 2025-04-11 DIAGNOSIS — M62.89 PELVIC FLOOR DYSFUNCTION IN FEMALE: ICD-10-CM

## 2025-04-11 DIAGNOSIS — R15.9 FULL INCONTINENCE OF FECES: Primary | ICD-10-CM

## 2025-04-11 PROCEDURE — 97163 PT EVAL HIGH COMPLEX 45 MIN: CPT | Performed by: PHYSICAL THERAPIST

## 2025-04-11 PROCEDURE — 97530 THERAPEUTIC ACTIVITIES: CPT | Performed by: PHYSICAL THERAPIST

## 2025-04-11 NOTE — THERAPY EVALUATION
"[facility]  Pel[facility] Physical Therapy [facility]  [address] [place] [address]  Dept: [phone]  Dept Fax: [phone]    Physical Therapy Initial Evaluation and Plan of Care    Patient: Amy Pyle   : 1965  Diagnosis/ICD-10 Code:  (R15.9) Full incontinence of feces  (primary encounter diagnosis)  (K59.02) Dyssynergic defecation  (M62.89) Pelvic floor dysfunction in female  (M62.89) Muscle tightness   Referring practitioner: Nikhil Burns MD  Date of Initial Visit: 2025  Today's Date: 2025  Patient seen for 1 sessions      Subjective    Chief Complaint: Pt reports she is coming to therapy due to constipation issues.  She is also noticing fecal incontinence regularly.  She is currently on a bowel routine with different medications/supplements to improve her ease of emptying her bowls.  Pt reports her issues started in  after hurting her back while at work.  At that time, she began taking pain meds and muscle relaxers.  Overtime she noticed bowel issues and where she was once having regular bowel movements, she then began to notice the frequency reducing.  Pt reports she has had abdominal pain and distension as well as straining to empty.  Her most recent BM was on , and she has been having them everyday since after taking the medications/supplements as prescribed by her doctor.  She describes her stool as \"sludge\" but she feels it is helping.  She reports she was told by her colorectal doctor that she has a tight pelvic floor also.  Pt reports she also is not able to walk much due her back pain and is limited in her activity.  She is not going out as much for fear of accidents and laying down is best position for her to relax.      Chief Complaint   Patient presents with    Pelvis - Initial Evaluation      Functional Outcome Measure: Constipation Questionnaire score: 51 %    Pain  8  Average:Pelvic #: 8 Best: Pelvic #: 4 Worst: Pelvic #: " 8  Location: abdomen/rectal region  Aggs: AGG: exertion, upright position, and bowel movement, walking or standing too much.     Bladder function:    Incontinence: yes, stress related   # of pads in 24 hours: 3   How soaked is pad when changed:   What specifically makes you leak: sneezing, coughing, laughing, cannot delay more than 10-15 minutes.   Leak at night: has wet the bed before without knowing.  Usually can get to bathroom without leaking, but occasionally will dribble.    Urination at night: 2  Use bathroom “just in case”: 1 time per day typically before she leaves her house   Strong urinary urge: yes     Leaking with urge: yes occasionally   Urge triggers: strong urge to go, vigorous activity, forward bending   Complete bladder voiding %: 100  Urinary splaying: None  Post-micturition dribble: No  Frequency of urination: 6 times per day  Discomfort with urination: None  Vaginal or anal itching: yes (diagnosed with pruritus ani)   Fluid irritants consumed daily: 4 caffeinated beverages per day  Fluid intake per day: 48oz per day  Food irritants consumed daily: None reported    Bowel function:    How many episodes of leakage/month: regularly, reports small amount of leakage throughout day - pt will notice it in her pad when she uses the restroom.   How many BM's/day: 1 currently, but prior to taking meds this last week she was only going at most 1 time per week and not fully emptying.   [place] Stool Scale Type: Type 1, Type 4 since meds and type 6 the last few days  Discomfort with BM: yes  Complete bowel voiding %: 20%  Assistance to pass stool: has attempted to digitally evacuate stool   Diet: Attempts to get in vegetables and protein often.  She likes green veggies.    Incontinence at night: Occasionally - doesn't know until she wipes herself.  Incontinence with gas: yes, occasionally   Ability to pass gas: yes    Sexual activity  Sexually active: No.  Pt reports she has not had sex in 18 years.     Prior  PT or other treatment: None for pelvic floor dysfunction.     Diagnostics:    PMH:   Past Medical History:   Diagnosis Date    Acute right-sided low back pain without sciatica 03/02/2021    Anxiety     Asthma     Chronic obstructive pulmonary disease 02/02/2021    Constipation     COPD (chronic obstructive pulmonary disease)     COVID-19 07/28/2022    COVID-19 long hauler 08/15/2022    Dysthymia 02/02/2021    Hallucination, visual 10/24/2022    Heart palpitations     Leukocytosis 03/02/2021    Mixed hyperlipidemia 03/02/2021        Surgical HX:   Past Surgical History:   Procedure Laterality Date    BLADDER REPAIR      HYSTERECTOMY      KIDNEY SURGERY          Menstrual cycle: No longer has - had hysterectomy in past and has had a bladder repair.       Birth HX   Number of pregnancies: 3  Number of vaginal births: 3  Number of C-sections: 0  Complications: None     Meds:   Current Outpatient Medications:     albuterol sulfate  (90 Base) MCG/ACT inhaler, Inhale 2 puffs Every 4 (Four) Hours As Needed for Wheezing., Disp: 1 g, Rfl: 2    Budeson-Glycopyrrol-Formoterol (Breztri Aerosphere) 160-9-4.8 MCG/ACT aerosol inhaler, Inhale 2 puffs 2 (Two) Times a Day., Disp: 8 g, Rfl: 2    Cholecalciferol (Vitamin D3) 50 MCG (2000 UT) capsule, Take 1 capsule by mouth Daily., Disp: 30 capsule, Rfl: 11    ipratropium-albuterol (DUO-NEB) 0.5-2.5 mg/3 ml nebulizer, Take 3 mL by nebulization 4 (Four) Times a Day., Disp: 1620 mL, Rfl: 1    lactulose (CHRONULAC) 10 GM/15ML solution, Take 30 mL by mouth 2 (Two) Times a Day., Disp: 30 mL, Rfl: 0    naproxen (NAPROSYN) 250 MG tablet, Take 1 tablet by mouth 2 (Two) Times a Day As Needed for Mild Pain., Disp: 60 tablet, Rfl: 0    polyethylene glycol (GoLYTELY) 236 g solution, Mix the Golytely solution and put it in the fridge a few hours before drinking. Cold is better! 5:00 PM the day before your colonoscopy It's time for your 1st dose of bowel prep. Drink half of the bottle within 1  hour. Sip each glass quickly, and using a straw might make it easier. Put the rest in the fridge for later. 5 AM the morning of your colonoscopy, take your 2nd dose of bowel prep., Disp: 4000 mL, Rfl: 0    sertraline (Zoloft) 100 MG tablet, Take 1 tablet by mouth Daily., Disp: 30 tablet, Rfl: 2     Occupation: Not currently employed.  Pt reports she has not worked since May of 2024.     Activity level/exercise routine: Sedentary.  Has difficulty with too much ambulation due to low back pain.       Objective    Patient independently ambulates into clinic. yes    Verbal consent obtained for internal pelvic exam/treatment with declined need for second person in room; however, pt prefers internal assessment to be performed at next visit.     Posture: slight forward head posture and rounded shoulders    Bony Landmarks: In standing R iliac crest lower, PSIS lower on right  Squat: Significantly limited and impaired due to low back pain.  Mini squat difficult for pt with reduced hip mobility present.     Palpation:   Supine:   Infrasternal Angle: mild rib flare   Breathing pattern:  chest dominant   Abdominals: tender present throughout abdomen.  Pt most tender along colon from RLQ into transverse colon region and along descending colon with palpation.    VAMSI:  2 FW-umbilicus,   2 FW 5cm above   3 FW 5cm below  Dept WNL    No Doming Noted  Iliacus : Tender and increased tension noted with palpation.   Psoas :  Tender and increased tension noted with palpation.   Adductors Reduced extensibility bilaterally.   Seated LE MMT: s  Seated Hip ROM  R ER 12 IR 22  L ER 25 IR 30     Special Tests:  Supine to long sit test: Positive for upslip on L  Standing single load transfer test: positive R  ASLR: Positive R      PELVIC FLOOR ASSESSMENT - pt deferred to next visit.   External:    Sensation: intact and equal to light touch BLE.  PF sensation NT this date.      Theract:  Pt education on diaphragmatic breathing, proper toileting  posture with use of squatty potty, relaxation of PF with use of breathing, proper emptying and proper bearing down to promote full emptying.  HEP provided with several positions for relaxation.  X20 min    Assessment/Plan:     Assessment/Plan    The patient is a 59 y.o. female who presents to physical therapy today for constipation related to dyssynergic defecation as well as fecal incontinence. Upon initial evaluation, the patient demonstrates impairments in her ability to fully relax her PF musculature.  Pt with Patient with reduced lumbar and hip mobility bilaterally which could also be contributing to her dysfunction.  Pt with tenderness and reduced fascial mobility throughout abdomen this date.  Patient presents with generalized core/PF weakness also at this time. Internal PF assessment to be performed at next session as pt preferred to defer today.  Due to these impairments, the patient is unable to fully empty her bowels, perform regular daily activities such as forward bending, and is having pain related to her abdominal distension/bloating when she is constipated. The patient would benefit from skilled pelvic PT services to address functional limitations and impairments and to improve patient quality of life.  Pt provided with HEP to begin this date.     Goals:   STG's: 4 weeks  Patient will report > 40% reduction in overall abdominal pain   Patient will report at 1 bowel movement per day with 70% or greater emptying reports.   Patient will be able to perform HEP with minimal verbal cues  Pt to demonstrate proper diaphragmatic breathing and PF relaxation to reduce straining during BM.      LTG's: By discharge  Patient will report a decrease in pain to < 3/10  Patient will report >80% improvement in bowel symptoms   Patient functional outcome measure test, Constipation Questionnaire improved score by >30 %  Patient will report an elimination of urinary and bowel urgency with elimination of fecal  incontinence.     Patient will report an elimination of nocturia to improve her sleep quality.    Patient to improve her PF and core/hip strength to 4/5 or greater to control her bladder/bowels with coughing, sneezing, and laughing.    Patient to be able to tolerate an internal PF exam with 1/10 pain at worst.   Patient will be independent with HEP      Plan  Therapy options: will be seen for skilled physical therapy services  Planned modality interventions: TENS, ultrasound, cryotherapy, thermotherapy (hydrocollator packs),   Planned therapy interventions: abdominal trunk stabilization, manual therapy, neuromuscular re-education, body mechanics training, flexibility, functional ROM exercises, gait training, home exercise program, joint mobilization, therapeutic activities, stretching, strengthening, spinal/joint mobilization, soft tissue mobilization and postural training  Pt prognosis: Good  Frequency: 1 visit per week   Duration in weeks: 12 weeks   Treatment plan discussed with: patient    Treatment Time: 0840 - 0950    Timed:           Therapeutic Activity:     20     mins  [id number];       Un-Timed:    High Eval                       50     Mins  [id number]          Timed Treatment:   20   mins   Total Treatment:     70   mins    PT SIGNATURE:  Vidya Leblanc PT, DPT    DATE TREATMENT INITIATED: 4/11/2025    Initial Certification  Certification Period: 7/10/2025  I certify that the therapy services are furnished while this patient is under my care.  The services outlined above are required by this patient, and will be reviewed every 90 days.     PHYSICIAN: Nikhil Burns MD    NPI:                                          DATE: 04/11/2025     Please sign and return via fax to [phone].   Thank you,   [facility].

## 2025-04-17 ENCOUNTER — APPOINTMENT (OUTPATIENT)
Facility: HOSPITAL | Age: 60
End: 2025-04-17
Payer: COMMERCIAL

## 2025-04-25 ENCOUNTER — ANESTHESIA (OUTPATIENT)
Dept: GASTROENTEROLOGY | Facility: HOSPITAL | Age: 60
End: 2025-04-25
Payer: COMMERCIAL

## 2025-04-25 ENCOUNTER — HOSPITAL ENCOUNTER (OUTPATIENT)
Facility: HOSPITAL | Age: 60
Setting detail: HOSPITAL OUTPATIENT SURGERY
Discharge: HOME | End: 2025-04-25
Attending: STUDENT IN AN ORGANIZED HEALTH CARE EDUCATION/TRAINING PROGRAM | Admitting: STUDENT IN AN ORGANIZED HEALTH CARE EDUCATION/TRAINING PROGRAM
Payer: COMMERCIAL

## 2025-04-25 ENCOUNTER — ANESTHESIA EVENT (OUTPATIENT)
Dept: GASTROENTEROLOGY | Facility: HOSPITAL | Age: 60
End: 2025-04-25
Payer: COMMERCIAL

## 2025-04-25 VITALS
SYSTOLIC BLOOD PRESSURE: 115 MMHG | BODY MASS INDEX: 34.37 KG/M2 | OXYGEN SATURATION: 91 % | DIASTOLIC BLOOD PRESSURE: 76 MMHG | HEIGHT: 67 IN | RESPIRATION RATE: 20 BRPM | TEMPERATURE: 98.7 F | WEIGHT: 219 LBS | HEART RATE: 82 BPM

## 2025-04-25 DIAGNOSIS — R15.9 FULL INCONTINENCE OF FECES: ICD-10-CM

## 2025-04-25 PROCEDURE — 88305 TISSUE EXAM BY PATHOLOGIST: CPT | Performed by: STUDENT IN AN ORGANIZED HEALTH CARE EDUCATION/TRAINING PROGRAM

## 2025-04-25 PROCEDURE — 25010000002 PROPOFOL 10 MG/ML EMULSION: Performed by: NURSE ANESTHETIST, CERTIFIED REGISTERED

## 2025-04-25 PROCEDURE — 25810000003 SODIUM CHLORIDE 0.9 % SOLUTION: Performed by: NURSE ANESTHETIST, CERTIFIED REGISTERED

## 2025-04-25 PROCEDURE — 25010000002 ONDANSETRON PER 1 MG: Performed by: NURSE ANESTHETIST, CERTIFIED REGISTERED

## 2025-04-25 PROCEDURE — 25010000002 LIDOCAINE PF 2% 2 % SOLUTION: Performed by: NURSE ANESTHETIST, CERTIFIED REGISTERED

## 2025-04-25 PROCEDURE — C1726 CATH, BAL DIL, NON-VASCULAR: HCPCS | Performed by: STUDENT IN AN ORGANIZED HEALTH CARE EDUCATION/TRAINING PROGRAM

## 2025-04-25 RX ORDER — IPRATROPIUM BROMIDE AND ALBUTEROL SULFATE 2.5; .5 MG/3ML; MG/3ML
3 SOLUTION RESPIRATORY (INHALATION) ONCE AS NEEDED
Status: DISCONTINUED | OUTPATIENT
Start: 2025-04-25 | End: 2025-04-25 | Stop reason: HOSPADM

## 2025-04-25 RX ORDER — SODIUM CHLORIDE 9 MG/ML
INJECTION, SOLUTION INTRAVENOUS CONTINUOUS PRN
Status: DISCONTINUED | OUTPATIENT
Start: 2025-04-25 | End: 2025-04-25 | Stop reason: SURG

## 2025-04-25 RX ORDER — LABETALOL HYDROCHLORIDE 5 MG/ML
5 INJECTION, SOLUTION INTRAVENOUS
Status: DISCONTINUED | OUTPATIENT
Start: 2025-04-25 | End: 2025-04-25 | Stop reason: HOSPADM

## 2025-04-25 RX ORDER — DIPHENHYDRAMINE HYDROCHLORIDE 50 MG/ML
12.5 INJECTION, SOLUTION INTRAMUSCULAR; INTRAVENOUS
Status: DISCONTINUED | OUTPATIENT
Start: 2025-04-25 | End: 2025-04-25 | Stop reason: HOSPADM

## 2025-04-25 RX ORDER — HYDRALAZINE HYDROCHLORIDE 20 MG/ML
5 INJECTION INTRAMUSCULAR; INTRAVENOUS
Status: DISCONTINUED | OUTPATIENT
Start: 2025-04-25 | End: 2025-04-25 | Stop reason: HOSPADM

## 2025-04-25 RX ORDER — ONDANSETRON 2 MG/ML
4 INJECTION INTRAMUSCULAR; INTRAVENOUS ONCE AS NEEDED
Status: COMPLETED | OUTPATIENT
Start: 2025-04-25 | End: 2025-04-25

## 2025-04-25 RX ORDER — PROPARACAINE HYDROCHLORIDE 5 MG/ML
1 SOLUTION/ DROPS OPHTHALMIC ONCE
Status: COMPLETED | OUTPATIENT
Start: 2025-04-25 | End: 2025-04-25

## 2025-04-25 RX ORDER — EPHEDRINE SULFATE/0.9% NACL/PF 25 MG/5 ML
5 SYRINGE (ML) INTRAVENOUS ONCE AS NEEDED
Status: DISCONTINUED | OUTPATIENT
Start: 2025-04-25 | End: 2025-04-25 | Stop reason: HOSPADM

## 2025-04-25 RX ORDER — ERYTHROMYCIN 5 MG/G
1 OINTMENT OPHTHALMIC EVERY 6 HOURS SCHEDULED
Status: DISCONTINUED | OUTPATIENT
Start: 2025-04-25 | End: 2025-04-25

## 2025-04-25 RX ORDER — PROPOFOL 10 MG/ML
VIAL (ML) INTRAVENOUS AS NEEDED
Status: DISCONTINUED | OUTPATIENT
Start: 2025-04-25 | End: 2025-04-25 | Stop reason: SURG

## 2025-04-25 RX ORDER — ERYTHROMYCIN 5 MG/G
1 OINTMENT OPHTHALMIC EVERY 6 HOURS SCHEDULED
Status: DISCONTINUED | OUTPATIENT
Start: 2025-04-25 | End: 2025-04-25 | Stop reason: HOSPADM

## 2025-04-25 RX ORDER — LIDOCAINE HYDROCHLORIDE 20 MG/ML
INJECTION, SOLUTION EPIDURAL; INFILTRATION; INTRACAUDAL; PERINEURAL AS NEEDED
Status: DISCONTINUED | OUTPATIENT
Start: 2025-04-25 | End: 2025-04-25 | Stop reason: SURG

## 2025-04-25 RX ORDER — KETOROLAC TROMETHAMINE 5 MG/ML
1 SOLUTION OPHTHALMIC EVERY 4 HOURS PRN
Status: DISCONTINUED | OUTPATIENT
Start: 2025-04-25 | End: 2025-04-25 | Stop reason: HOSPADM

## 2025-04-25 RX ADMIN — LIDOCAINE HYDROCHLORIDE 100 MG: 20 INJECTION, SOLUTION EPIDURAL; INFILTRATION; INTRACAUDAL; PERINEURAL at 12:54

## 2025-04-25 RX ADMIN — PROPOFOL 50 MG: 10 INJECTION, EMULSION INTRAVENOUS at 12:54

## 2025-04-25 RX ADMIN — ONDANSETRON 4 MG: 2 INJECTION, SOLUTION INTRAMUSCULAR; INTRAVENOUS at 14:09

## 2025-04-25 RX ADMIN — PROPARACAINE HYDROCHLORIDE 1 DROP: 5 SOLUTION/ DROPS OPHTHALMIC at 14:34

## 2025-04-25 RX ADMIN — ERYTHROMYCIN 1 APPLICATION: 5 OINTMENT OPHTHALMIC at 14:35

## 2025-04-25 RX ADMIN — SODIUM CHLORIDE: 9 INJECTION, SOLUTION INTRAVENOUS at 12:35

## 2025-04-25 RX ADMIN — PROPOFOL 175 MCG/KG/MIN: 10 INJECTION, EMULSION INTRAVENOUS at 12:55

## 2025-04-25 RX ADMIN — KETOROLAC TROMETHAMINE 1 DROP: 5 SOLUTION/ DROPS OPHTHALMIC at 14:35

## 2025-04-25 NOTE — OP NOTE
[facility] [place] Colorectal Surgery  Colonoscopy Examination     Name: Amy Pyle  : 1965  Date of Colonoscopy: 2025  Procedure: Diagnostic colonoscopy for worsening constipation  Surgeon: Nikhil Burns MD   Anesthesia: Sedation   IV Fluids: refer to anesthesia record    Procedure Details:    Prior to the procedure, history and physical exam was performed. Patient's medication and allergies were reviewed. The risk and benefits of the procedure and sedation options and risks were discussed with the patient. All questions were answered and informed consent was obtained.    The patient was brought to the endoscopy suite and placed in the left lateral decubitus position. Conscious sedation was administered by the anesthesia team. Vital signs including blood pressure, heart rate, and oxygen saturation were monitored continuously throughout the procedure.    The colonoscope was introduced through the rectum and advanced through the entire colon under direct visualization. The scope was maneuvered carefully, and the mucosa of the rectum, sigmoid colon, descending colon, transverse colon, ascending colon, and cecum were thoroughly inspected. Adequate insufflation was maintained throughout the procedure for optimal visualization.    Findings:    Poor colonic prep specially on the right colon.  Less than 50% of the surface of the right colon was visualized due to liquid and solid stool.     Rectum: Normal appearance with no lesions,polyps, or abnormalities.  Sigmoid colon: There are scattered diverticulosis.  There was a stricture in the mid sigmoid colon with no obvious lesion.  Biopsy was taken.  This is likely due to diverticulosis.  The strictured area was not able to be traversed with adult colonoscope.  A pediatric colonoscope was used was able to pass through with some difficulty.  The strictured area was dilated with endoscopic balloon to 18 mmHg.  The area was then traversed with no difficulty.  Biopsies were  taken over the strictured area.                                                   Descending colon: 20 mm sessile polyp was removed with a hot snare  Transverse colon: 5 mm polyp in the proximal colon was removed with cold biopsy forcep  Ascending colon: Normal appearance with no lesions,polyps, or abnormalities.  Cecum: Normal appearance with no lesions,polyps, or abnormalities.    Procedure Images:            Interventions:  Transverse colon ( 5 mm) cold biopsy forceps   Descending colon ( 20 mm, sessile) hot snare   Sigmoid colon ( stricture) balloon dilatation, biopsy     Specimens:  The removed polyp was retrieved and sent for histopathological examination to the pathology department for further analysis.    Recommendation:     Repeat colonoscopy in 1 year  Follow up in clinic for further discussion     Conclusion:  The diagnostic colonoscopy was completed successfully, and the entire colon was visualized without any complications. The patient tolerated the procedure well and was transferred to the recovery area in stable condition. Post-procedure instructions and follow-up were discussed with the patient and will be provided in written form.    Nikhil Burns MD  Colon and Rectal Surgery   [facility] [place], [address]  T: [phone]

## 2025-04-25 NOTE — ANESTHESIA PREPROCEDURE EVALUATION
Anesthesia Evaluation     Patient summary reviewed   no history of anesthetic complications:   NPO Solid Status: > 8 hours  NPO Liquid Status: > 6 hours           Airway   Mallampati: II  TM distance: >3 FB  Neck ROM: full  Dental      Pulmonary    (+) a smoker Current, COPD, asthma,  Cardiovascular     (+) hyperlipidemia      Neuro/Psych  GI/Hepatic/Renal/Endo    (+) obesity, liver disease    Musculoskeletal     Abdominal    Substance History      OB/GYN          Other      history of cancer remission                Anesthesia Plan    ASA 3     general   total IV anesthesia  intravenous induction     Anesthetic plan, risks, benefits, and alternatives have been provided, discussed and informed consent has been obtained with: patient.    Plan discussed with CRNA.    CODE STATUS:

## 2025-04-25 NOTE — ANESTHESIA POSTPROCEDURE EVALUATION
Patient: Amy Pyle    Procedure Summary       Date: 04/25/25 Room / Location: [facility] ENDOSCOPY 4 / [facility] ENDOSCOPY    Anesthesia Start: 1248 Anesthesia Stop: 1359    Procedure: COLONOSCOPY WITH FORCEP POLYPECTOMY X 1, HOT SNARE POLYPECTOMY X 1, BALLOON DILATION OF SIGMOID COLON STRICTURE (15MM-18MM) UP TO 18MM, AND BIOPSIES (Rectum) Diagnosis:       Full incontinence of feces      (Full incontinence of feces [R15.9])    Surgeons: Nikhil Burns MD Provider: Dary Seals MD    Anesthesia Type: general ASA Status: 3            Anesthesia Type: general    Vitals  Vitals Value Taken Time   /74 04/25/25 14:25   Temp     Pulse 78 04/25/25 14:26   Resp 20 04/25/25 14:20   SpO2 89 % 04/25/25 14:26   Vitals shown include unfiled device data.        Post Anesthesia Care and Evaluation    Patient location during evaluation: PACU  Patient participation: complete - patient participated  Level of consciousness: awake and alert  Pain management: satisfactory to patient    Airway patency: patent  Anesthetic complications: No anesthetic complications  PONV Status: none  Cardiovascular status: acceptable  Respiratory status: acceptable  Hydration status: acceptable

## 2025-04-25 NOTE — DISCHARGE INSTRUCTIONS
A responsible adult should stay with you and you should rest quietly for the rest of the day.    Do not drink alcohol, drive, operate any heavy machinery or power tools or make any legal/important decisions for the next 24 hours.     If you begin to experience severe pain, increased shortness of breath, racing heartbeat or a fever above 101 F, seek immediate medical attention.     Follow up with MD as instructed. Call office for results in 3 to 5 days if needed.     Dr Burns Office at [phone]

## 2025-04-25 NOTE — NURSING NOTE
Patient complain of right eye burning and hurting behind eye, Dr Galindo in to see patient, orders received and given.

## 2025-05-02 LAB
LAB AP CASE REPORT: NORMAL
LAB AP DIAGNOSIS COMMENT: NORMAL
PATH REPORT.FINAL DX SPEC: NORMAL
PATH REPORT.GROSS SPEC: NORMAL

## 2025-05-06 ENCOUNTER — OFFICE VISIT (OUTPATIENT)
Dept: FAMILY MEDICINE CLINIC | Facility: CLINIC | Age: 60
End: 2025-05-06

## 2025-05-06 VITALS
SYSTOLIC BLOOD PRESSURE: 138 MMHG | HEIGHT: 67 IN | WEIGHT: 218 LBS | OXYGEN SATURATION: 97 % | DIASTOLIC BLOOD PRESSURE: 80 MMHG | BODY MASS INDEX: 34.21 KG/M2 | HEART RATE: 83 BPM

## 2025-05-06 DIAGNOSIS — J43.8 OTHER EMPHYSEMA: ICD-10-CM

## 2025-05-06 DIAGNOSIS — K59.04 CHRONIC IDIOPATHIC CONSTIPATION: Primary | ICD-10-CM

## 2025-05-06 DIAGNOSIS — M51.362 DEGENERATION OF INTERVERTEBRAL DISC OF LUMBAR REGION WITH DISCOGENIC BACK PAIN AND LOWER EXTREMITY PAIN: ICD-10-CM

## 2025-05-06 DIAGNOSIS — F33.1 MODERATE EPISODE OF RECURRENT MAJOR DEPRESSIVE DISORDER: ICD-10-CM

## 2025-05-06 PROCEDURE — 99214 OFFICE O/P EST MOD 30 MIN: CPT | Performed by: NURSE PRACTITIONER

## 2025-05-06 RX ORDER — CALCIUM CARBONATE 300MG(750)
4 TABLET,CHEWABLE ORAL
Qty: 12 EACH | Refills: 3 | Status: SHIPPED | OUTPATIENT
Start: 2025-05-06 | End: 2025-08-04

## 2025-05-06 RX ORDER — SERTRALINE HYDROCHLORIDE 100 MG/1
150 TABLET, FILM COATED ORAL DAILY
Qty: 45 TABLET | Refills: 2 | Status: SHIPPED | OUTPATIENT
Start: 2025-05-06 | End: 2025-06-05

## 2025-05-06 RX ORDER — CALCIUM CARBONATE 300MG(750)
4 TABLET,CHEWABLE ORAL
Qty: 12 EACH | Refills: 3 | Status: SHIPPED | OUTPATIENT
Start: 2025-05-06 | End: 2025-05-06

## 2025-05-06 NOTE — PROGRESS NOTES
Chief Complaint  Follow-up (3 month follow up COPD ) and Constipation (Ongoing issues )    Subjective        Amy Pyle presents to [facility]  History of Present Illness    Patient presents for f/u visit.     COPD, prescribed Breztri 2 puffs twice daily. She also has DuoNeb treatments to use as needed. Patient does have a history of pulmonary nodules. CT chest last completed in 2/26/25 and showed stable micronodules in the left lower lobe, likely benign. Previous right middle lobe infiltrate and previous small left lower lobe nodule have resolved. Supplemental oxygen ordered at last visit - could not afford cost due to deductible not being met.     Moderate to severe depression, prescribed Zoloft 100 mg daily. Dose increased at last visit, sx improving.     Patient has severe chronic low back pain.  Patient was injured at work in September 2023.  She underwent MRI spine, treated with NSAIDS and epidural injections. Note from 12/21/23: Patient injured at work on 9/13 and has been seeing [facility] occupational medicine. Patient completed physical therapy without relief. She is also using a TENS unit.  MRI of L-spine without contrast was completed on 11/6.  The findings are as follows: 1.  Lumbar spondylosis with multilevel neural foraminal stenosis and mild spinal canal stenosis at L3-L4. 2.  Complex/septated cystic lesion is seen anteriorly within the left renal midpole. Patient also underwent partial nephrectomy r/t CA. She is followed by Dr. Allen. Patient was reportedly cleared to return to work (specific to back pain) on 4/15/24 per Dr. Leon after one epidural. Patient returned to work, had progressing pain. She also has weakness to right leg. Patient seen again by Dr. Leon on 6/10 - advised concerns were unrelated to acute injury, unsure if anything more could be done. Patient referred to spine surgery to reevaluate. Repeat MRI of L-spine showed multilevel  "degenerative changes as characterized above, not significantly changed since prior study when accounting for differences in technique and motion artifact noted on current study. Patient evaluated by Dr. Bradshaw with [facility] - ordered a second opinion from Pain Management and Flexeril PRN pain. She does not get relief from muscle relaxants.  Patient was evaluated by [facility].  She was started on Zanaflex, Celebrex, Norco, Lyrica and amitriptyline - all medications have since been stopped.  They also started her on a compounding cream.  Additional x-rays were ordered and patient advised to follow-up in 6 weeks. Another injection planned but follow up has been lost because [facility] advised they cannot treat her due to not being original pain management provider.  Dr. Loen is the original provider but has left practice. A Dr. You (Ortho, Workman's Comp) - recommendation were to consult a spinal surgeon to evaluate her for surgical treatment at the L3-L4 level. This evaluation was dated 12/30/24 - canceled as Workman's [employer] advised they would not cover. She has also filed for social security disability.     Patient reported at last visit having instances where she cannot hold her bowels or bladder.  Patient referred to Colorectal surgery and had a KUB.  KUB was compatible with constipation.  She met with colorectal surgery, advised treatment of constipation.  Sitz marker study was also ordered to rule out colonic inertia.  Pelvic therapy tried but patient states \"I just cannot do it.\"  Patient is taking mineral oil,  Magnesium and Miralax as recommended, will follow up in late May. MOM/Lactulose also ordered x 1 - did not help.  Colonoscopy has also been completed. Last BM was Sunday and was small, last BM prior to that was 10 days ago.     Objective   Vital Signs:  /80 (BP Location: Left arm, Patient Position: Sitting, Cuff Size: Large Adult)   Pulse 83   Ht 170.2 cm " "(67\")   Wt 98.9 kg (218 lb)   SpO2 97%   BMI 34.14 kg/m²   Estimated body mass index is 34.14 kg/m² as calculated from the following:    Height as of this encounter: 170.2 cm (67\").    Weight as of this encounter: 98.9 kg (218 lb).            Physical Exam  Constitutional:       Appearance: Normal appearance.   HENT:      Head: Normocephalic.   Cardiovascular:      Rate and Rhythm: Normal rate and regular rhythm.   Pulmonary:      Effort: Pulmonary effort is normal.      Breath sounds: Normal breath sounds.   Abdominal:      General: Abdomen is flat. Bowel sounds are normal.      Palpations: Abdomen is soft.   Musculoskeletal:         General: Normal range of motion.      Cervical back: Neck supple.      Right lower leg: No edema.      Left lower leg: No edema.   Skin:     General: Skin is warm and dry.   Neurological:      Mental Status: She is alert and oriented to person, place, and time.      Gait: Gait is intact.   Psychiatric:         Attention and Perception: Attention normal.         Mood and Affect: Mood normal.         Speech: Speech normal.        Result Review :    CMP          2/6/2025    09:01   CMP   Glucose 103    BUN 9    Creatinine 0.73    EGFR 93.7    Sodium 140    Potassium 4.5    Chloride 104    Calcium 9.2    Total Protein 7.3    Albumin 4.4    Globulin 2.9    Total Bilirubin 0.5    Alkaline Phosphatase 58    AST (SGOT) 15    ALT (SGPT) 11    Albumin/Globulin Ratio 1.5    BUN/Creatinine Ratio 12.3    Anion Gap 9.6      CBC          2/6/2025    09:01   CBC   WBC 10.40    RBC 5.17    Hemoglobin 14.2    Hematocrit 45.0    MCV 87.0    MCH 27.5    MCHC 31.6    RDW 12.8    Platelets 347      Lipid Panel          2/6/2025    09:01   Lipid Panel   Total Cholesterol 283    Triglycerides 149    HDL Cholesterol 54    VLDL Cholesterol 28    LDL Cholesterol  201    LDL/HDL Ratio 3.69      TSH          2/6/2025    09:01   TSH   TSH 0.999      Most Recent A1C          2/6/2025    09:01   HGBA1C Most Recent " "  Hemoglobin A1C 5.70      Data reviewed : Consultant notes Colorectal            Assessment and Plan   Diagnoses and all orders for this visit:    1. Chronic idiopathic constipation (Primary)  Assessment & Plan:  Reviewed GI note  Continue MiraLAX, mineral oil, Magnesium  Add sample Linzess 145 mcg daily  Increase water intake    Orders:  -     linaclotide (Linzess) 145 MCG capsule capsule; Take 1 capsule by mouth Every Morning Before Breakfast for 14 days.  Dispense: 14 capsule; Refill: 0    2. Moderate episode of recurrent major depressive disorder  Assessment & Plan:  Patient's depression is a recurrent episode that is moderate without psychosis. Depression is active and improving with treatment.    Plan:   Medication  dose was adjusted;  increase Zoloft 150 mg daily    Followup in 3 months.     Orders:  -     sertraline (Zoloft) 100 MG tablet; Take 1.5 tablets by mouth Daily for 30 days.  Dispense: 45 tablet; Refill: 2    3. Degeneration of intervertebral disc of lumbar region with discogenic back pain and lower extremity pain  Assessment & Plan:  Patient is not taking  any medication  She is resting at home, has less pain not working and \"taking it easy.'  Patient has filed for disability    Orders:  -     Nerve Stimulator (TENS Therapy Replace Back Pads) misc; Use 4 Pads Every 30 (Thirty) Days for 90 days.  Dispense: 12 each; Refill: 3    4. Other emphysema  Assessment & Plan:  COPD is stable.    Plan:  Continue same medication/s without change.    Discussed medication dosage, use, side effects, and goals of treatment in detail.    Warning signs of respiratory distress were reviewed with the patient. .    Patient Treatment Goals:   symptom prevention, minimizing limitation in activity, prevention of exacerbations and use of ER/inpatient care, maintenance of optimal pulmonary function, and minimization of adverse effects of treatment    Followup at the next regular appointment.             I spent 30 minutes " caring for Amy on this date of service. This time includes time spent by me in the following activities:preparing for the visit, reviewing tests, obtaining and/or reviewing a separately obtained history, performing a medically appropriate examination and/or evaluation , counseling and educating the patient/family/caregiver, ordering medications, tests, or procedures, documenting information in the medical record, and care coordination  Follow Up   Return in about 3 months (around 8/6/2025) for COPD.  Patient was given instructions and counseling regarding her condition or for health maintenance advice. Please see specific information pulled into the AVS if appropriate.

## 2025-05-06 NOTE — ASSESSMENT & PLAN NOTE
"Patient is not taking  any medication  She is resting at home, has less pain not working and \"taking it easy.'  Patient has filed for disability  "

## 2025-05-06 NOTE — ASSESSMENT & PLAN NOTE
Reviewed GI note  Continue MiraLAX, mineral oil, Magnesium  Add sample Linzess 145 mcg daily  Increase water intake

## 2025-05-06 NOTE — ASSESSMENT & PLAN NOTE
Patient's depression is a recurrent episode that is moderate without psychosis. Depression is active and improving with treatment.    Plan:   Medication  dose was adjusted;  increase Zoloft 150 mg daily    Followup in 3 months.

## 2025-05-24 NOTE — ASSESSMENT & PLAN NOTE
1.  Breztri 2 puffs twice daily  2.  DuoNeb treatments as needed for dyspnea or wheezing  3.  Restart Singulair 10 mg nightly  4.  Start theophylline as instructed per pulmonology  5.  Doxycycline 100 mg twice daily x7 days  6.  Medrol Dosepak  7.  Proceed with CT chest     no

## 2025-06-27 ENCOUNTER — OFFICE VISIT (OUTPATIENT)
Age: 60
End: 2025-06-27
Payer: COMMERCIAL

## 2025-06-27 VITALS
DIASTOLIC BLOOD PRESSURE: 80 MMHG | HEIGHT: 67 IN | BODY MASS INDEX: 33.42 KG/M2 | WEIGHT: 212.9 LBS | OXYGEN SATURATION: 95 % | HEART RATE: 82 BPM | TEMPERATURE: 98.6 F | SYSTOLIC BLOOD PRESSURE: 124 MMHG

## 2025-06-27 DIAGNOSIS — K64.8 BLEEDING INTERNAL HEMORRHOIDS: ICD-10-CM

## 2025-06-27 DIAGNOSIS — R15.9 FULL INCONTINENCE OF FECES: Primary | ICD-10-CM

## 2025-06-27 DIAGNOSIS — K59.02 CONSTIPATION DUE TO OUTLET DYSFUNCTION: ICD-10-CM

## 2025-06-27 PROCEDURE — 99204 OFFICE O/P NEW MOD 45 MIN: CPT | Performed by: STUDENT IN AN ORGANIZED HEALTH CARE EDUCATION/TRAINING PROGRAM

## 2025-06-30 NOTE — PROGRESS NOTES
Colorectal Surgery Followup Note    ID:  Amy Pyle;   : 1965  DATE OF VISIT: 2025    Chief Complaint  incontinence of feces (Follow up)       Subjective     Amy hasn't started her pelvic PT due to financial reasons. She continues to experience severe constipation and incontinence. She feels her abdomen is more distended. She reports passing flatus. She reports discomfort while having BM.   Exam  General:  No acute distress  Head: Normocephalic, atraumatic  Neuro: Alert and oriented     Abdomen:  Soft, non-tender, non-distended, no hernias, no hepatomegaly, no splenomegaly. No abnormal, audible bowel sounds.    External anorectal exam: moderate skin irritation, external tags located in all side . Sensation diminished no anal wink present. No significant perineal descent. Visible rectocele   Digital rectal: no tenderness with exam, no palpable masses, tone is slightly decreased. Spastic levator muscle with paradoxical muscle contraction      Assessment  - -Fecal incontinence  - constipation from dyssynergic defecation    -Symptomatic internal hemorrhoids  -Pruritus ani  - rectocele   Plan / Recommendations  -Her management has been challenging due to financial strain. She has not done any pelvic PT. I do believe she will have significant benefit from pelvic PT. I have reached out to our pelvic PT to figure oiut how we can help her.   - continue with metamucil and miralax for now   - If she can't do pelvic PT, we are limited in our options to correct her defecatory issues and pelvic pain. We can possible consider botox injection   - follow up in 4 weeks.         Nikhil Burns MD  Colon and Rectal Surgery   Sat[facility] Rodríguez

## 2025-07-16 ENCOUNTER — TELEPHONE (OUTPATIENT)
Dept: SPEECH THERAPY | Facility: HOSPITAL | Age: 60
End: 2025-07-16
Payer: COMMERCIAL

## 2025-08-05 ENCOUNTER — DOCUMENTATION (OUTPATIENT)
Facility: HOSPITAL | Age: 60
End: 2025-08-05
Payer: COMMERCIAL

## 2025-08-06 ENCOUNTER — OFFICE VISIT (OUTPATIENT)
Dept: FAMILY MEDICINE CLINIC | Facility: CLINIC | Age: 60
End: 2025-08-06
Payer: COMMERCIAL

## 2025-08-06 ENCOUNTER — LAB (OUTPATIENT)
Dept: FAMILY MEDICINE CLINIC | Facility: CLINIC | Age: 60
End: 2025-08-06
Payer: COMMERCIAL

## 2025-08-06 VITALS
SYSTOLIC BLOOD PRESSURE: 132 MMHG | BODY MASS INDEX: 32.8 KG/M2 | WEIGHT: 209 LBS | HEIGHT: 67 IN | DIASTOLIC BLOOD PRESSURE: 90 MMHG | HEART RATE: 80 BPM | OXYGEN SATURATION: 98 %

## 2025-08-06 DIAGNOSIS — Z00.00 PREVENTATIVE HEALTH CARE: ICD-10-CM

## 2025-08-06 DIAGNOSIS — F41.9 ANXIETY: ICD-10-CM

## 2025-08-06 DIAGNOSIS — M25.50 GENERALIZED JOINT PAIN: ICD-10-CM

## 2025-08-06 DIAGNOSIS — M51.362 DEGENERATION OF INTERVERTEBRAL DISC OF LUMBAR REGION WITH DISCOGENIC BACK PAIN AND LOWER EXTREMITY PAIN: ICD-10-CM

## 2025-08-06 DIAGNOSIS — L30.9 DERMATITIS: ICD-10-CM

## 2025-08-06 DIAGNOSIS — F33.1 MODERATE EPISODE OF RECURRENT MAJOR DEPRESSIVE DISORDER: ICD-10-CM

## 2025-08-06 DIAGNOSIS — R07.9 CHEST PAIN, UNSPECIFIED TYPE: ICD-10-CM

## 2025-08-06 DIAGNOSIS — K59.04 CHRONIC IDIOPATHIC CONSTIPATION: Primary | ICD-10-CM

## 2025-08-06 DIAGNOSIS — Z90.5 S/P NEPHRECTOMY: ICD-10-CM

## 2025-08-06 DIAGNOSIS — J43.8 OTHER EMPHYSEMA: ICD-10-CM

## 2025-08-06 DIAGNOSIS — M79.89 HAND SWELLING: ICD-10-CM

## 2025-08-06 LAB
DEPRECATED RDW RBC AUTO: 41.7 FL (ref 37–54)
ERYTHROCYTE [DISTWIDTH] IN BLOOD BY AUTOMATED COUNT: 13 % (ref 12.3–15.4)
HCT VFR BLD AUTO: 45.6 % (ref 34–46.6)
HGB BLD-MCNC: 14.6 G/DL (ref 12–15.9)
MCH RBC QN AUTO: 28.2 PG (ref 26.6–33)
MCHC RBC AUTO-ENTMCNC: 32 G/DL (ref 31.5–35.7)
MCV RBC AUTO: 88.2 FL (ref 79–97)
PLATELET # BLD AUTO: 340 10*3/MM3 (ref 140–450)
PMV BLD AUTO: 10.7 FL (ref 6–12)
RBC # BLD AUTO: 5.17 10*6/MM3 (ref 3.77–5.28)
WBC NRBC COR # BLD AUTO: 12.42 10*3/MM3 (ref 3.4–10.8)

## 2025-08-06 PROCEDURE — 83880 ASSAY OF NATRIURETIC PEPTIDE: CPT | Performed by: NURSE PRACTITIONER

## 2025-08-06 PROCEDURE — 36415 COLL VENOUS BLD VENIPUNCTURE: CPT | Performed by: NURSE PRACTITIONER

## 2025-08-06 PROCEDURE — 86038 ANTINUCLEAR ANTIBODIES: CPT | Performed by: NURSE PRACTITIONER

## 2025-08-06 PROCEDURE — 86140 C-REACTIVE PROTEIN: CPT | Performed by: NURSE PRACTITIONER

## 2025-08-06 PROCEDURE — 85027 COMPLETE CBC AUTOMATED: CPT | Performed by: NURSE PRACTITIONER

## 2025-08-06 PROCEDURE — 83036 HEMOGLOBIN GLYCOSYLATED A1C: CPT | Performed by: NURSE PRACTITIONER

## 2025-08-06 PROCEDURE — 80053 COMPREHEN METABOLIC PANEL: CPT | Performed by: NURSE PRACTITIONER

## 2025-08-06 PROCEDURE — 83735 ASSAY OF MAGNESIUM: CPT | Performed by: NURSE PRACTITIONER

## 2025-08-06 RX ORDER — CELECOXIB 100 MG/1
100 CAPSULE ORAL 2 TIMES DAILY PRN
Qty: 180 CAPSULE | OUTPATIENT
Start: 2025-08-06

## 2025-08-06 RX ORDER — CELECOXIB 100 MG/1
100 CAPSULE ORAL 2 TIMES DAILY PRN
Qty: 60 CAPSULE | Refills: 0 | Status: SHIPPED | OUTPATIENT
Start: 2025-08-06

## 2025-08-06 RX ORDER — BUSPIRONE HYDROCHLORIDE 5 MG/1
5 TABLET ORAL 2 TIMES DAILY
Qty: 60 TABLET | Refills: 1 | Status: SHIPPED | OUTPATIENT
Start: 2025-08-06

## 2025-08-06 RX ORDER — HALOBETASOL PROPIONATE 0.5 MG/G
1 CREAM TOPICAL 2 TIMES DAILY
Qty: 60 G | Refills: 0 | Status: SHIPPED | OUTPATIENT
Start: 2025-08-06 | End: 2025-09-05

## 2025-08-07 LAB
ALBUMIN SERPL-MCNC: 4.3 G/DL (ref 3.5–5.2)
ALBUMIN/GLOB SERPL: 1.6 G/DL
ALP SERPL-CCNC: 47 U/L (ref 39–117)
ALT SERPL W P-5'-P-CCNC: 12 U/L (ref 1–33)
ANION GAP SERPL CALCULATED.3IONS-SCNC: 14.3 MMOL/L (ref 5–15)
AST SERPL-CCNC: 13 U/L (ref 1–32)
BILIRUB SERPL-MCNC: 0.6 MG/DL (ref 0–1.2)
BUN SERPL-MCNC: 10 MG/DL (ref 8–23)
BUN/CREAT SERPL: 11.4 (ref 7–25)
CALCIUM SPEC-SCNC: 9.2 MG/DL (ref 8.6–10.5)
CHLORIDE SERPL-SCNC: 102 MMOL/L (ref 98–107)
CO2 SERPL-SCNC: 22.7 MMOL/L (ref 22–29)
CREAT SERPL-MCNC: 0.88 MG/DL (ref 0.57–1)
CRP SERPL-MCNC: <0.3 MG/DL (ref 0–0.5)
EGFRCR SERPLBLD CKD-EPI 2021: 74.9 ML/MIN/1.73
GLOBULIN UR ELPH-MCNC: 2.7 GM/DL
GLUCOSE SERPL-MCNC: 100 MG/DL (ref 65–99)
HBA1C MFR BLD: 5.8 % (ref 4.8–5.6)
MAGNESIUM SERPL-MCNC: 2.3 MG/DL (ref 1.6–2.4)
NT-PROBNP SERPL-MCNC: <36 PG/ML (ref 0–900)
POTASSIUM SERPL-SCNC: 4.5 MMOL/L (ref 3.5–5.2)
PROT SERPL-MCNC: 7 G/DL (ref 6–8.5)
SODIUM SERPL-SCNC: 139 MMOL/L (ref 136–145)

## 2025-08-08 LAB — ANA SER QL: NEGATIVE

## 2025-08-12 ENCOUNTER — RESULTS FOLLOW-UP (OUTPATIENT)
Dept: FAMILY MEDICINE CLINIC | Facility: CLINIC | Age: 60
End: 2025-08-12
Payer: COMMERCIAL

## (undated) DEVICE — PK ENDO GI 50

## (undated) DEVICE — SINGLE-USE BIOPSY FORCEPS: Brand: RADIAL JAW 4

## (undated) DEVICE — SNAR POLYP HOTSNARE/BRAIDED OVL/MINI 7F 2.8X10MM 230CM 1P/U

## (undated) DEVICE — ESOPHAGEAL/PYLORIC/COLONIC/BILIARY WIREGUIDED BALLOON DILATATION CATHETER: Brand: CRE™ PRO

## (undated) DEVICE — TRAP WIDEEYE POLYP

## (undated) DEVICE — 9165 UNIVERSAL PATIENT PLATE: Brand: 3M™

## (undated) DEVICE — DEV INFL CRE STERIFLATE 60CC DISP